# Patient Record
Sex: MALE | Race: WHITE | NOT HISPANIC OR LATINO | Employment: OTHER | URBAN - METROPOLITAN AREA
[De-identification: names, ages, dates, MRNs, and addresses within clinical notes are randomized per-mention and may not be internally consistent; named-entity substitution may affect disease eponyms.]

---

## 2017-01-20 ENCOUNTER — OFFICE VISIT (OUTPATIENT)
Dept: RHEUMATOLOGY | Facility: PHYSICIAN GROUP | Age: 72
End: 2017-01-20
Payer: COMMERCIAL

## 2017-01-20 VITALS
BODY MASS INDEX: 29.4 KG/M2 | HEIGHT: 68 IN | OXYGEN SATURATION: 94 % | SYSTOLIC BLOOD PRESSURE: 140 MMHG | DIASTOLIC BLOOD PRESSURE: 82 MMHG | TEMPERATURE: 98.4 F | HEART RATE: 72 BPM | RESPIRATION RATE: 12 BRPM | WEIGHT: 194 LBS

## 2017-01-20 DIAGNOSIS — M35.3 POLYMYALGIA RHEUMATICA (HCC): ICD-10-CM

## 2017-01-20 PROCEDURE — 1101F PT FALLS ASSESS-DOCD LE1/YR: CPT | Mod: 8P | Performed by: INTERNAL MEDICINE

## 2017-01-20 PROCEDURE — G8484 FLU IMMUNIZE NO ADMIN: HCPCS | Performed by: INTERNAL MEDICINE

## 2017-01-20 PROCEDURE — 99205 OFFICE O/P NEW HI 60 MIN: CPT | Performed by: INTERNAL MEDICINE

## 2017-01-20 PROCEDURE — 3017F COLORECTAL CA SCREEN DOC REV: CPT | Mod: 8P | Performed by: INTERNAL MEDICINE

## 2017-01-20 PROCEDURE — G8420 CALC BMI NORM PARAMETERS: HCPCS | Performed by: INTERNAL MEDICINE

## 2017-01-20 PROCEDURE — 1036F TOBACCO NON-USER: CPT | Performed by: INTERNAL MEDICINE

## 2017-01-20 PROCEDURE — G8432 DEP SCR NOT DOC, RNG: HCPCS | Performed by: INTERNAL MEDICINE

## 2017-01-20 PROCEDURE — 4040F PNEUMOC VAC/ADMIN/RCVD: CPT | Performed by: INTERNAL MEDICINE

## 2017-01-20 RX ORDER — ASPIRIN 325 MG
325 TABLET ORAL EVERY 6 HOURS PRN
COMMUNITY

## 2017-01-20 RX ORDER — FLECAINIDE ACETATE 100 MG/1
100 TABLET ORAL 2 TIMES DAILY
COMMUNITY

## 2017-01-20 RX ORDER — MONTELUKAST SODIUM 4 MG/1
1 TABLET, CHEWABLE ORAL 2 TIMES DAILY
COMMUNITY

## 2017-01-20 NOTE — MR AVS SNAPSHOT
"        Kris Chaudhary   2017 3:00 PM   Office Visit   MRN: [id number]    Department:  Rheumatology [facility]   Dept Phone:  [phone]    Description:  Male : 1945   Provider:  Karen Colvin M.D.           Reason for Visit     New Patient new patient      Allergies as of 2017     Allergen Noted Reactions    Cat Hair Extract 03/10/2015       Trees, grass    Statins [Hmg-Coa-R Inhibitors] 2015       Muscle aches      You were diagnosed with     Polymyalgia rheumatica (CMS-HCC)   [725.ICD-9-CM]         Vital Signs     Blood Pressure Pulse Temperature Respirations Height Weight    140/82 mmHg 72 36.9 °C (98.4 °F) 12 1.721 m (5' 7.75\") 87.998 kg (194 lb)    Body Mass Index Oxygen Saturation Smoking Status             29.71 kg/m2 94% Never Smoker          Basic Information     Date Of Birth Sex Race Ethnicity Preferred Language    1945 Male White Non- English      Problem List              ICD-10-CM Priority Class Noted - Resolved    Atrial fibrillation with RVR (CMS-HCC) I48.91 High  3/1/2015 - Present    Fibromyalgia M79.7   3/2/2015 - Present    HTN (hypertension) I10   3/2/2015 - Present    Allergy to statin medication Z88.8   3/2/2015 - Present    Elevated troponin R79.89   3/2/2015 - Present      Health Maintenance        Date Due Completion Dates    IMM DTaP/Tdap/Td Vaccine (1 - Tdap) 1965 ---    COLONOSCOPY 1996 ---    IMM ZOSTER VACCINE 2006 ---    IMM PNEUMOCOCCAL 65+ (ADULT) LOW/MEDIUM RISK SERIES (2 of 2 - PCV13) 10/10/2013 10/10/2012    IMM INFLUENZA (1) 2016 10/10/2014            Current Immunizations     Influenza TIV (IM) 10/10/2014    Pneumococcal Vaccine (PCV7) Historical Data 10/10/2012    Pneumococcal polysaccharide vaccine (PPSV-23) 10/10/2012      Below and/or attached are the medications your provider expects you to take. Review all of your home medications and newly ordered medications with your provider and/or pharmacist. Follow medication " instructions as directed by your provider and/or pharmacist. Please keep your medication list with you and share with your provider. Update the information when medications are discontinued, doses are changed, or new medications (including over-the-counter products) are added; and carry medication information at all times in the event of emergency situations     Allergies:  CAT HAIR EXTRACT - (reactions not documented)     STATINS - (reactions not documented)               Medications  Valid as of: January 20, 2017 -  4:12 PM    Generic Name Brand Name Tablet Size Instructions for use    Ascorbic Acid (Tab) ascorbic acid 500 MG Take 1,000 mg by mouth every day.        Aspirin (Tab)  MG Take 325 mg by mouth every 6 hours as needed.        Colestipol HCl (Tab) COLESTID 1 GM Take 1 g by mouth 2 times a day.        Flecainide Acetate (Tab) TAMBOCOR 100 MG Take 100 mg by mouth 2 times a day.        Glucosamine-Chondroitin   Take 1 Tab by mouth every day.        Loratadine (Tab) CLARITIN 10 MG Take 10 mg by mouth every day.        Multiple Vitamins-Minerals (Tab) THERAGRAN-M  Take 1 Tab by mouth every day.        Omega-3 Fatty Acids (Cap) fish oil 1000 MG Take 1,000 mg by mouth every morning before breakfast.        .                 Medicines prescribed today were sent to:     None      Medication refill instructions:       If your prescription bottle indicates you have medication refills left, it is not necessary to call your provider’s office. Please contact your pharmacy and they will refill your medication.    If your prescription bottle indicates you do not have any refills left, you may request refills at any time through one of the following ways: The online [employer] system (except Urgent Care), by calling your provider’s office, or by asking your pharmacy to contact your provider’s office with a refill request. Medication refills are processed only during regular business hours and may not be available  until the next business day. Your provider may request additional information or to have a follow-up visit with you prior to refilling your medication.   *Please Note: Medication refills are assigned a new Rx number when refilled electronically. Your pharmacy may indicate that no refills were authorized even though a new prescription for the same medication is available at the pharmacy. Please request the medicine by name with the pharmacy before contacting your provider for a refill.        Your To Do List     Future Labs/Procedures Complete By Expires    CRP QUANTITIVE (NON-CARDIAC)  2/1/2017 1/20/2018    VITAMIN D,25 HYDROXY  As directed 1/20/2018    WESTERGREN SED RATE  As directed 1/20/2018         [employer] Access Code: Activation code not generated  Current [employer] Status: Active

## 2017-01-20 NOTE — PROGRESS NOTES
Chief Complaint- Polymyalgia Rheumatica    Chief Complaint   Patient presents with   • New Patient     new patient     Kris Chaudhary is a 71 y.o. male here as a new Rheumatology Consult referred by Pcp Pt States None for consultation regarding polymyalgia Rheumatica    HPI:   Mr. Kris Chaudhary presents with a history of PMR that was diagnosed during a hospitalization in 2014 for a atrial fibrillation exacerbation.  His symptoms presented with diffuse body aches and he brought this to the attention of the medical team.  He was treated with prednisone and reports an immediate response.  He states is took him 1 year to taper of prednisone.    He has been well until October 2016 when he developed aches and pain in the legs.  He also felt he was unable to raise his shoulders.  He was concern this may have been a flare of his PMR.  He also was started on physical therapy and reports improvement in his shoulder mobility with his shoulders.    Mr. Kris Chaudhary present with intermittent episodes of pain at the CMC joints and bilateral wrist pain exacerbated with use.  He denies prolonged morning stiffness.    Today he denies any symptoms of morning stiffness, shoulder pain, or symptoms resembling PMR.  He also denies vision changes or vision loss, scalp tenderness, headache, jaw claudication.      Past medical History: PMR responsive to steroids in May 2014 that coincided with his initial incidence of atrial fibrillation s/p cardioversion.  November 23 of 2016 and Dec 7 2016, basal cell cancer, nephrolithiasis    Family History: Father had HTN, mother had lung cancer was also previous smoker, maternal grandmother had breast cancer    Social History: avid skiier, NEVER smoker    He denies morning stiffness, no Raynauds, no dry eyes no dry mouth, no jaw claudication, no scalp tenderness, no vision changes, no worsening tinnitus, no abdominal pain.    Current medicines (including changes today)  Current Outpatient  Prescriptions   Medication Sig Dispense Refill   • flecainide (TAMBOCOR) 100 MG Tab Take 100 mg by mouth 2 times a day.     • aspirin (ASA) 325 MG Tab Take 325 mg by mouth every 6 hours as needed.     • colestipol (COLESTID) 1 GM Tab Take 1 g by mouth 2 times a day.     • loratadine (CLARITIN) 10 MG TABS Take 10 mg by mouth every day.     • therapeutic multivitamin-minerals (THERAGRAN-M) TABS Take 1 Tab by mouth every day.     • Glucosamine-Chondroitin (MOVE FREE PO) Take 1 Tab by mouth every day.     • ascorbic acid (ASCORBIC ACID) 500 MG TABS Take 1,000 mg by mouth every day.     • Omega-3 Fatty Acids (FISH OIL) 1000 MG CAPS capsule Take 1,000 mg by mouth every morning before breakfast.       No current facility-administered medications for this visit.     He  has a past medical history of Renal disorder and Arrhythmia (2014).  He  has past surgical history that includes rotator cuff repair and other orthopedic surgery (1976).  No family history on file.  No family status information on file.     Social History   Substance Use Topics   • Smoking status: Never Smoker    • Smokeless tobacco: Never Used   • Alcohol Use: Yes      Comment: 2 drinks per week     Social History     Social History Narrative   • No narrative on file         ROS  Constitutional ROS: No unexplained fevers, sweats, or chills  Eye ROS: No eye pain, redness, discharge  Ear ROS: No recent change in hearing  Mouth/Throat ROS: No recent change in voice or hoarseness  Neck ROS: No significant pain in neck  Pulmonary ROS: No dyspnea on exertion, No wheezing  Cardiovascular ROS: No chest pain, No shortness of breath  Gastrointestinal ROS: No nausea, vomiting, diarrhea, or constipation  Musculoskeletal/Extremities ROS: No peripheral edema  Hematologic/Lymphatic ROS: No night sweats  Skin/Integumentary ROS: color, texture and temperature normal  Neurologic ROS: Normal development       Objective:     Blood pressure 140/82, pulse 72, temperature 36.9 °C  "(98.4 °F), resp. rate 12, height 1.721 m (5' 7.75\"), weight 87.998 kg (194 lb), SpO2 94 %. Body mass index is 29.71 kg/(m^2).  Physical Exam:    Filed Vitals:    01/20/17 1501   BP: 140/82   Pulse: 72   Temp: 36.9 °C (98.4 °F)   Resp: 12   Height: 1.721 m (5' 7.75\")   Weight: 87.998 kg (194 lb)   SpO2: 94%    Body mass index is 29.71 kg/(m^2).    General/Constitutional: NAD not diaphoretic, comfortable  Eyes: clear conjunctiva, no scleral icterus, EOMI,  Ears, Nose, Mouth,Throat: no oral ulcers, good dentition, moist mucous membranes, no discharge from ears bilaterally  Cardiovascular: regular rate and rhythm.  No murmurs, gallops, rubs, 2 + temporal pulses  Respiratory: normal effort, unlabored respiration.  On auscultation no wheezes, rales, rhonchi.  Clear to auscultation.  GI: soft, NTTP no hepatosplenomegaly, nondistended  Musculoskeletal  Axial:  Cervical: mild limited ROM with rotation at 45 degrees right and left  Thoracic: no midline tenderness and no paraspinal tenderenss  Lumbar: no midline tenderness  Upper Extremities:  No synovitis of the PIP, DIP, MCP  There is mild Heberdon nodes at the DIP on the 5th digit right hand  Wrists and Elbows have good ROM  Muscle Strength: 5/5 in deltoids, biceps, triceps, finger , wrists extension bilateral  Lower Extremities:  No knee effusion bilateral, No crepitus bilateral  Gait is normal  Skin: Limited skin exam.  no rashes, no digital ulcerations, no alopecia, no tophi, no skin thickening, no nodules  Neuro: CN II-XII grossly intact, Alert, Oriented x 3, moves all four extremities  Psych: normal affect, normal mood, judgement appropriate, follows commands, responses are appropritae  Heme/Lymph: no cervical adenopathy      No results found for: QNTTBGOLD  No results found for: HEPBCORTOT, HEPBCORIGM, HEPBSAG  No results found for: HEPCAB  Lab Results   Component Value Date/Time    SODIUM 139 03/02/2015 05:08 AM    POTASSIUM 3.6 03/02/2015 05:08 AM    CHLORIDE " 107 03/02/2015 05:08 AM    CO2 23 03/02/2015 05:08 AM    GLUCOSE 135* 03/02/2015 05:08 AM    BUN 17 03/02/2015 05:08 AM    CREATININE 1.17 03/02/2015 05:08 AM      Lab Results   Component Value Date/Time    WBC 6.4 03/02/2015 05:08 AM    RBC 4.39* 03/02/2015 05:08 AM    HEMOGLOBIN 13.5* 03/02/2015 05:08 AM    HEMATOCRIT 39.8* 03/02/2015 05:08 AM    MCV 90.7 03/02/2015 05:08 AM    MCH 30.8 03/02/2015 05:08 AM    MCHC 33.9 03/02/2015 05:08 AM    MPV 11.0 03/02/2015 05:08 AM    NEUTROPHILS-POLYS 68.5 03/01/2015 08:08 PM    LYMPHOCYTES 18.5* 03/01/2015 08:08 PM    MONOCYTES 10.6 03/01/2015 08:08 PM    EOSINOPHILS 1.2 03/01/2015 08:08 PM    BASOPHILS 0.6 03/01/2015 08:08 PM      Lab Results   Component Value Date/Time    CALCIUM 8.9 03/02/2015 05:08 AM    AST(SGOT) 41 03/02/2015 05:08 AM    ALT(SGPT) 16 03/02/2015 05:08 AM    ALKALINE PHOSPHATASE 73 03/02/2015 05:08 AM    TOTAL BILIRUBIN 0.7 03/02/2015 05:08 AM    ALBUMIN 3.6 03/02/2015 05:08 AM    TOTAL PROTEIN 6.3 03/02/2015 05:08 AM     No results found for: URICACID, RHEUMFACTN, CCPANTIBODY, ANTINUCAB  No results found for: SEDRATEWES, CREACTPROT  No results found for: RUSSELVIPER, DRVVTINTERP  No results found for: R0WRULLLXOM, [id number], IGGCARDIOLI, IGMCARDIOLI, IGACARDIOLI, CRYOGLOBULIN  No results found for: ANADIRECT, ANTIDNADS, RNPAB, SMITHAB, KWPTFNB21, SSAROAB, SSBLAAB, BWVPII9ES, CENTROMBAB  No results found for: ANTIDNADS, DSDNA, AGBMAB, GBMABA, ANCAIGG, [id number], JO1AB, RNPAB, ANTISSASJ, ANTISSBSJ  No results found for: COLORURINE, SPECGRAVITY, PHURINE, GLUCOSEUR, KETONES, PROTEINURIN  No results found for: TOTPROTUR, TOTALVOLUME, [id number]  No results found for: SSA60, SSA52  No results found for: HBA1C  No results found for: CPKTOTAL  No results found for: G6PD  No results found for: [id number]  No results found for: ACESERUM  No results found for: 25HYDROXY  No results found for: TSH, FREEDIR  Lab Results   Component Value Date/Time    TSH 2.330  03/01/2015 08:08 PM    FREE T-4 0.86 03/01/2015 08:08 PM     No results found for: MICROSOMALA, ANTITHYROGL  No results found for: IGGLYMEABS  No results found for: ANTIMITOCHO, FACTIN  No results found for: IGA, TTRANSIGA, ENDOIGA  No results found for: FLTYPE, CRYSTALSBDF  No results found for: ISTATICAL  No results found for: ISTATCREAT  No results found for: CTELOPEP  No results found for: GBMABG  No results found for: PTHINTACT      Outside labs show a CRP of 0.71 which is normal. Creatinine was 1.2 with a GFR of 60 his vitamin D level was 43.3 these are labs from [place] 10, 2016 his white blood cell count was 5.2 hemoglobin was 14.8 platelets were 206    He has a wrist x-ray that shows decreasing soft tissue swelling with continuing indeterminate chip fracture at the distal carpal bones appreciated on x-ray generally fourth 2016    Notes from June 24, 2016 showed that he wanted to go to physical therapy for shoulder pain                        Results for orders placed during the hospital encounter of 03/01/15   2-D ECHO W/DOPPLER (ECHOCARDIOGRAM COMP W/O CONT)               Assessment and Plan:  Mr. Kris Chaudhary presents with a history of PMR but today is asymptomatic.  We will recheck ESR and CRP to obtain his baseline reading.  In addition we discussed symptoms of GCA and some PMR patient to develop GCA.    We also discussed the side effects of prolonged use of prednisone.    At this time he is not on prednisone nor are there active symptoms.  We discussed should he flare we will check his ESR and CRP and consider another course of prednisone.  Also it is important to discuss should he have recurrent episodes and we are unable to taper we would need to consider DMARDS    [facility]  Advise patient to take calcium and vitamin D  Will check vitamin D level    1. Polymyalgia rheumatica (CMS-HCC)  flecainide (TAMBOCOR) 100 MG Tab    aspirin (ASA) 325 MG Tab    colestipol (COLESTID) 1 GM Tab    VITAMIN D,25  HYDROXY    WESTERGREN SED RATE    CRP QUANTITIVE (NON-CARDIAC)       Followup: Return in about 4 weeks (around 2/17/2017). or sooner prn  Patient was seen 60 minutes face-to-face (excluding time for procedures)  of which more than 50% the time was spent counseling the patient regarding  rheumatological conditions and care. Therapy was discussed in detail.  Thank you for this referral.

## 2017-10-27 ASSESSMENT — ENCOUNTER SYMPTOMS
CHILLS: 0
FEVER: 0

## 2017-10-28 NOTE — PROGRESS NOTES
Subjective:   Date of Consultation:10/30/2017  7:21 AM  Primary care physician:Pcp Pt States None      Reason for Consultation:  Mr. Chaudhary  is a pleasant 71 y.o. year old male who presented with follow-up PMR      PMR and arthralgia  At our initial visit on 1/20/2017 he was to check ESR and CRP.  We were to consider another course if prednisone.  He reports feeling pretty well in January but since has declined.Since our last visit he has had increasing myalgia in the thigh but also morning stiffness in the hands for > 1 hour.  He reports starting a stain two months but his worsening symptoms and myalgias started prior to start of the statins.  He denies xerostomia.  He denies dry eyes    [facility]    [facility] was 1.46 mg/dL (0.05-0.7)  Rheumatoid factor  Negative  (0-14)  Esr 16 (0/20)    February 27/2017  CRP was 1.6 mg /dL (0-5)  Esr = 16      Rash   started a few months ago.  He reports a few weeks of intermittent pruritis over the     RHEUM HISTORY    Mr. Kris Chaudhary presents with a history of PMR that was diagnosed during a hospitalization in 2014 for a atrial fibrillation exacerbation.  His symptoms presented with diffuse body aches and he brought this to the attention of the medical team.  He was treated with prednisone and reports an immediate response.  He states it took him 1 year to taper of prednisone.    He has been well until October 2016 when he developed aches and pain in the legs.  He also felt he was unable to raise his shoulders.  He was concern this may have been a flare of his PMR.  He also was started on physical therapy and reports improvement in his shoulder mobility with his shoulders.    At our initial visit he denies any symptoms of PMR or GCA.  However he presented with wrist pain bilateral exacerbated with use .               Past medical History: PMR responsive to steroids in May 2014 that coincided with his initial incidence of atrial fibrillation s/p cardioversion.  November 23  of 2016 and Dec 7 2016, basal cell cancer, nephrolithiasis.  Reports history of statin myalgias??     Family History: Father had HTN, mother had lung cancer was also previous smoker, maternal grandmother had breast cancer     Social History: hobby - skies,  NEVER smoker       Past Medical History:   Diagnosis Date   • Arrhythmia 2014    aflutter   • Renal disorder     stones, frequently     Past Surgical History:   Procedure Laterality Date   • OTHER ORTHOPEDIC SURGERY  1976    Back   • ROTATOR CUFF REPAIR      L shoulder     Allergies   Allergen Reactions   • Cat Hair Extract      Trees, grass   • Statins [Hmg-Coa-R Inhibitors]      Muscle aches     Outpatient Encounter Prescriptions as of 10/30/2017   Medication Sig Dispense Refill   • rosuvastatin (CRESTOR) 5 MG Tab Take 5 mg by mouth every evening.     • Coenzyme Q10 (EQL COQ10) 300 MG Cap Take  by mouth.     • aspirin (ASA) 81 MG Chew Tab chewable tablet Take 81 mg by mouth every day.     • flecainide (TAMBOCOR) 100 MG Tab Take 100 mg by mouth 2 times a day.     • loratadine (CLARITIN) 10 MG TABS Take 10 mg by mouth every day.     • therapeutic multivitamin-minerals (THERAGRAN-M) TABS Take 1 Tab by mouth every day.     • aspirin (ASA) 325 MG Tab Take 325 mg by mouth every 6 hours as needed.     • colestipol (COLESTID) 1 GM Tab Take 1 g by mouth 2 times a day.     • Glucosamine-Chondroitin (MOVE FREE PO) Take 1 Tab by mouth every day.     • ascorbic acid (ASCORBIC ACID) 500 MG TABS Take 1,000 mg by mouth every day.     • Omega-3 Fatty Acids (FISH OIL) 1000 MG CAPS capsule Take 1,000 mg by mouth every morning before breakfast.       No facility-administered encounter medications on file as of 10/30/2017.        Social History     Social History   • Marital status:      Spouse name: N/A   • Number of children: N/A   • Years of education: N/A     Occupational History   • Not on file.     Social History Main Topics   • Smoking status: Never Smoker   •  Smokeless tobacco: Never Used   • Alcohol use Yes      Comment: 2 drinks per week   • Drug use: No   • Sexual activity: Not on file     Other Topics Concern   • Not on file     Social History Narrative   • No narrative on file      History   Smoking Status   • Never Smoker   Smokeless Tobacco   • Never Used     History   Alcohol Use   • Yes     Comment: 2 drinks per week     History   Drug Use No       No family history on file.    Review of Systems   Constitutional: Negative for chills and fever.   Musculoskeletal: Positive for joint pain and myalgias.        Objective:   /86   Pulse 60   Temp 36.2 °C (97.2 °F)   Resp 12   Wt 88.9 kg (196 lb)   SpO2 100%   BMI 30.02 kg/m²     Physical Exam   Constitutional: He is oriented to person, place, and time. He appears well-developed and well-nourished. No distress.   HENT:   Head: Normocephalic and atraumatic.   Right Ear: External ear normal.   Left Ear: External ear normal.   Eyes: Conjunctivae are normal. Right eye exhibits no discharge. Left eye exhibits no discharge. No scleral icterus.   Pulmonary/Chest: No stridor.   Musculoskeletal:   No periarticular swelling bilateral of the MCP, PIP, DIP  No wrist swelling bilateral  Bony enlargement over the MCP   Neurological: He is alert and oriented to person, place, and time. Abnormal muscle tone: PMR.   Skin: Rash noted. He is not diaphoretic.   Excoriations over the upper back and bilateral at the level of T9   Psychiatric: He has a normal mood and affect. His behavior is normal. Judgment and thought content normal.       Assessment:     1. PMR (polymyalgia rheumatica) (CMS-[facility])  CCP    MISA ANTIBODY WITH REFLEX    HEP B CORE AB TOTAL    HEP B SURFACE ANTIGEN    HEP C VIRUS ANTIBODY    HEP B SURFACE AB    CBC WITH DIFFERENTIAL    COMP METABOLIC PANEL    CRP QUANTITIVE (NON-CARDIAC)    WESTERGREN SED RATE    G6PD QUANT + RBC    CREATINE KINASE    DX-HAND 3+ LEFT    DX-HAND 3+ RIGHT   2. Myalgia  CREATINE KINASE    3. Atrial fibrillation with RVR (CMS-HCC)     4. Arthralgia, unspecified joint  CCP    MISA ANTIBODY WITH REFLEX    HEP B CORE AB TOTAL    HEP B SURFACE ANTIGEN    HEP C VIRUS ANTIBODY    HEP B SURFACE AB    CBC WITH DIFFERENTIAL    COMP METABOLIC PANEL    CRP QUANTITIVE (NON-CARDIAC)    WESTERGREN SED RATE    G6PD QUANT + RBC    CREATINE KINASE    DX-HAND 3+ LEFT    DX-HAND 3+ RIGHT     Labs:    No results found for: QNTTBGOLD  No results found for: HEPBCORTOT, HEPBCORIGM, HEPBSAG  No results found for: HEPCAB  Lab Results   Component Value Date/Time    SODIUM 139 03/02/2015 05:08 AM    POTASSIUM 3.6 03/02/2015 05:08 AM    CHLORIDE 107 03/02/2015 05:08 AM    CO2 23 03/02/2015 05:08 AM    GLUCOSE 135 (H) 03/02/2015 05:08 AM    BUN 17 03/02/2015 05:08 AM    CREATININE 1.17 03/02/2015 05:08 AM      Lab Results   Component Value Date/Time    WBC 6.4 03/02/2015 05:08 AM    RBC 4.39 (L) 03/02/2015 05:08 AM    HEMOGLOBIN 13.5 (L) 03/02/2015 05:08 AM    HEMATOCRIT 39.8 (L) 03/02/2015 05:08 AM    MCV 90.7 03/02/2015 05:08 AM    MCH 30.8 03/02/2015 05:08 AM    MCHC 33.9 03/02/2015 05:08 AM    MPV 11.0 03/02/2015 05:08 AM    NEUTSPOLYS 68.5 03/01/2015 08:08 PM    LYMPHOCYTES 18.5 (L) 03/01/2015 08:08 PM    MONOCYTES 10.6 03/01/2015 08:08 PM    EOSINOPHILS 1.2 03/01/2015 08:08 PM    BASOPHILS 0.6 03/01/2015 08:08 PM      Lab Results   Component Value Date/Time    CALCIUM 8.9 03/02/2015 05:08 AM    ASTSGOT 41 03/02/2015 05:08 AM    ALTSGPT 16 03/02/2015 05:08 AM    ALKPHOSPHAT 73 03/02/2015 05:08 AM    TBILIRUBIN 0.7 03/02/2015 05:08 AM    ALBUMIN 3.6 03/02/2015 05:08 AM    TOTPROTEIN 6.3 03/02/2015 05:08 AM     No results found for: URICACID, RHEUMFACTN, CCPANTIBODY, ANTINUCAB  No results found for: SEDRATEWES, CREACTPROT  No results found for: RUSSELVIPER, DRVVTINTERP  No results found for: W4AJEJTPOFU, J9XPIKARTCR, IGGCARDIOLI, IGMCARDIOLI, IGACARDIOLI, CRYOGLOBULIN  No results found for: ANADIRECT, ANTIDNADS, RNPAB,  SMITHAB, YJTVPIN84, SSAROAB, SSBLAAB, ZEUUWY8ZR, CENTROMBAB  No results found for: ANTIDNADS, DSDNA, AGBMAB, GBMABA, ANCAIGG, [id number], JO1AB, RNPAB, ANTISSASJ, ANTISSBSJ  No results found for: COLORURINE, SPECGRAVITY, PHURINE, GLUCOSEUR, KETONES, PROTEINURIN  No results found for: TOTPROTUR, TOTALVOLUME, XFOGRYIS63  No results found for: SSA60, SSA52  No results found for: HBA1C  No results found for: CPKTOTAL  No results found for: G6PD  No results found for: APVW75AFVN  No results found for: ACESERUM  No results found for: 25HYDROXY  No results found for: TSH, FREEDIR  Lab Results   Component Value Date/Time    TSHULTRASEN 2.330 03/01/2015 08:08 PM    FREET4 0.86 03/01/2015 08:08 PM     No results found for: MICROSOMALA, ANTITHYROGL  No results found for: IGGLYMEABS  No results found for: ANTIMITOCHO, FACTIN  No results found for: IGA, TTRANSIGA, ENDOIGA  No results found for: FLTYPE, CRYSTALSBDF  No results found for: ISTATICAL  No results found for: ISTATCREAT  No results found for: CTELOPEP  No results found for: GBMABG  No results found for: PTHINTACT      Medical Decision Making:  Today's Assessment / Status / Plan:     PMR  CRP is elevated   Thus far no hip or shoulder stiffness that is deviating from baseline  Will repeat ESR and CRP  If elevated willl discuss possibly restarting prednisone and a DMARD  Today we discussed plaquenil briefly.      Hand arthralgias  With prolonged morning stiffness.  No synovitis on exam.  [facility] - [facility]  Will obtain hand xrays  Check MISA and CCP      Rash  Advise patient to follow-up with PCP is progressive  Could be contact dermatitis though denies changing his soap or detergent.   He does recall onset of rash after starting CoQ10.  SUggest he see if stopping this will improve the rash  He does have a history of shingles but it is bilateral and no evidence or history  of fluid filled blisters.  He denies this calvin similar to his  previous history of shingles.      1. PMR (polymyalgia rheumatica) (CMS-[facility])  CCP    MISA ANTIBODY WITH REFLEX    HEP B CORE AB TOTAL    HEP B SURFACE ANTIGEN    HEP C VIRUS ANTIBODY    HEP B SURFACE AB    CBC WITH DIFFERENTIAL    COMP METABOLIC PANEL    CRP QUANTITIVE (NON-CARDIAC)    WESTERGREN SED RATE    G6PD QUANT + RBC    CREATINE KINASE    DX-HAND 3+ LEFT    DX-HAND 3+ RIGHT   2. Myalgia  CREATINE KINASE   3. Atrial fibrillation with RVR (CMS-[facility])     4. Arthralgia, unspecified joint  CCP    MISA ANTIBODY WITH REFLEX    HEP B CORE AB TOTAL    HEP B SURFACE ANTIGEN    HEP C VIRUS ANTIBODY    HEP B SURFACE AB    CBC WITH DIFFERENTIAL    COMP METABOLIC PANEL    CRP QUANTITIVE (NON-CARDIAC)    WESTERGREN SED RATE    G6PD QUANT + RBC    CREATINE KINASE    DX-HAND 3+ LEFT    DX-HAND 3+ RIGHT     Return in about 3 weeks (around 11/20/2017).      I have spent greater than 50% of this 30 minute visit in counseling/coordination of care with the patient regarding his therapy plan.    He agreed and verbalized his agreement and understanding with the current plan. I answered all questions and concerns he has at this time and advised him to call at any time in there interim with questions or concerns in regards to his care.    Thank you for allowing me to participate in his care, I will continue to follow closely.

## 2017-10-30 ENCOUNTER — OFFICE VISIT (OUTPATIENT)
Dept: RHEUMATOLOGY | Facility: PHYSICIAN GROUP | Age: 72
End: 2017-10-30
Payer: COMMERCIAL

## 2017-10-30 VITALS
DIASTOLIC BLOOD PRESSURE: 86 MMHG | RESPIRATION RATE: 12 BRPM | SYSTOLIC BLOOD PRESSURE: 144 MMHG | WEIGHT: 196 LBS | HEART RATE: 60 BPM | OXYGEN SATURATION: 100 % | TEMPERATURE: 97.2 F | BODY MASS INDEX: 30.02 KG/M2

## 2017-10-30 DIAGNOSIS — I48.91 ATRIAL FIBRILLATION WITH RVR (HCC): ICD-10-CM

## 2017-10-30 DIAGNOSIS — M35.3 PMR (POLYMYALGIA RHEUMATICA) (HCC): ICD-10-CM

## 2017-10-30 DIAGNOSIS — M79.10 MYALGIA: ICD-10-CM

## 2017-10-30 DIAGNOSIS — M25.50 ARTHRALGIA, UNSPECIFIED JOINT: ICD-10-CM

## 2017-10-30 PROCEDURE — 99214 OFFICE O/P EST MOD 30 MIN: CPT | Performed by: INTERNAL MEDICINE

## 2017-10-30 RX ORDER — ROSUVASTATIN CALCIUM 5 MG/1
5 TABLET, COATED ORAL EVERY EVENING
COMMUNITY

## 2017-10-30 RX ORDER — ASPIRIN 81 MG/1
81 TABLET, CHEWABLE ORAL DAILY
COMMUNITY

## 2017-10-30 ASSESSMENT — ENCOUNTER SYMPTOMS: MYALGIAS: 1

## 2017-10-30 NOTE — LETTER
[facility]   [address], [address]  [place] [address]  Phone: [phone]  Fax: [phone]              Encounter Date: 10/30/2017    Dear Dr. Pittman ref. provider found,    It was a pleasure seeing your patient, Kris Chaudhary, on 10/30/2017. Diagnoses of PMR (polymyalgia rheumatica) (CMS-[facility]), Myalgia, Atrial fibrillation with RVR (CMS-[facility]), and Arthralgia, unspecified joint were pertinent to this visit.     Please find attached progress note which includes the history I obtained from Mr. Chaudhary, my physical examination findings, my impression and recommendations.      Once again, it was a pleasure participating in your patient's care.  Please feel free to contact me if you have any questions or if I can be of any further assistance to your patients.      Sincerely,    Karen Colvin M.D.  Electronically Signed          PROGRESS NOTE:  Subjective:   Date of Consultation:10/30/2017  7:21 AM  Primary care physician:Pcp Pt States None      Reason for Consultation:  Mr. Chaudhary  is a pleasant 71 y.o. year old male who presented with follow-up PMR      PMR  At our initial visit on 1/20/2017 he was to check ESR and CRP.  We were to consider another course if prednisone.  He reports feeling pretty well in January but since has declined.Since our last visit he has had increasing myalgia in the thigh but also morning stiffness in the hands for > 1 hour.  He reports starting a stain two months but his worsening symptoms and myalgias started prior to start of the statins.  He denies xerostomia.  He denies dry eyes    [facility]    CRP HS was 1.46 mg/dL (0.05-0.7)  Rheumatoid factor  Negative  (0-14)  Esr 16 (0/20)    February 27/2017  CRP was 1.6 mg /dL (0-5)  Esr = 16      Rash   started a few months ago.  He reports a few weeks of intermittent pruritis over the     RHEUM HISTORY    Mr. Kris Chaudhary presents with a history of PMR that was diagnosed during a hospitalization in 2014 for a  atrial fibrillation exacerbation.  His symptoms presented with diffuse body aches and he brought this to the attention of the medical team.  He was treated with prednisone and reports an immediate response.  He states it took him 1 year to taper of prednisone.    He has been well until October 2016 when he developed aches and pain in the legs.  He also felt he was unable to raise his shoulders.  He was concern this may have been a flare of his PMR.  He also was started on physical therapy and reports improvement in his shoulder mobility with his shoulders.    At our initial visit he denies any symptoms of PMR or GCA.  However he presented with wrist pain bilateral exacerbated with use .               Past medical History: PMR responsive to steroids in May 2014 that coincided with his initial incidence of atrial fibrillation s/p cardioversion.  November 23 of 2016 and Dec 7 2016, basal cell cancer, nephrolithiasis.  Reports history of statin myalgias??     Family History: Father had HTN, mother had lung cancer was also previous smoker, maternal grandmother had breast cancer     Social History: hobby - skies,  NEVER smoker       Past Medical History:   Diagnosis Date   • Arrhythmia 2014    aflutter   • Renal disorder     stones, frequently     Past Surgical History:   Procedure Laterality Date   • OTHER ORTHOPEDIC SURGERY  1976    Back   • ROTATOR CUFF REPAIR      L shoulder     Allergies   Allergen Reactions   • Cat Hair Extract      Trees, grass   • Statins [Hmg-Coa-R Inhibitors]      Muscle aches     Outpatient Encounter Prescriptions as of 10/30/2017   Medication Sig Dispense Refill   • rosuvastatin (CRESTOR) 5 MG Tab Take 5 mg by mouth every evening.     • Coenzyme Q10 (EQL COQ10) 300 MG Cap Take  by mouth.     • aspirin (ASA) 81 MG Chew Tab chewable tablet Take 81 mg by mouth every day.     • flecainide (TAMBOCOR) 100 MG Tab Take 100 mg by mouth 2 times a day.     • loratadine (CLARITIN) 10 MG TABS Take 10 mg by  mouth every day.     • therapeutic multivitamin-minerals (THERAGRAN-M) TABS Take 1 Tab by mouth every day.     • aspirin (ASA) 325 MG Tab Take 325 mg by mouth every 6 hours as needed.     • colestipol (COLESTID) 1 GM Tab Take 1 g by mouth 2 times a day.     • Glucosamine-Chondroitin (MOVE FREE PO) Take 1 Tab by mouth every day.     • ascorbic acid (ASCORBIC ACID) 500 MG TABS Take 1,000 mg by mouth every day.     • Omega-3 Fatty Acids (FISH OIL) 1000 MG CAPS capsule Take 1,000 mg by mouth every morning before breakfast.       No facility-administered encounter medications on file as of 10/30/2017.        Social History     Social History   • Marital status:      Spouse name: N/A   • Number of children: N/A   • Years of education: N/A     Occupational History   • Not on file.     Social History Main Topics   • Smoking status: Never Smoker   • Smokeless tobacco: Never Used   • Alcohol use Yes      Comment: 2 drinks per week   • Drug use: No   • Sexual activity: Not on file     Other Topics Concern   • Not on file     Social History Narrative   • No narrative on file      History   Smoking Status   • Never Smoker   Smokeless Tobacco   • Never Used     History   Alcohol Use   • Yes     Comment: 2 drinks per week     History   Drug Use No       No family history on file.    Review of Systems   Constitutional: Negative for chills and fever.   Musculoskeletal: Positive for joint pain and myalgias.        Objective:   /86   Pulse 60   Temp 36.2 °C (97.2 °F)   Resp 12   Wt 88.9 kg (196 lb)   SpO2 100%   BMI 30.02 kg/m²      Physical Exam   Constitutional: He is oriented to person, place, and time. He appears well-developed and well-nourished. No distress.   HENT:   Head: Normocephalic and atraumatic.   Right Ear: External ear normal.   Left Ear: External ear normal.   Eyes: Conjunctivae are normal. Right eye exhibits no discharge. Left eye exhibits no discharge. No scleral icterus.   Pulmonary/Chest: No  stridor.   Musculoskeletal:   No periarticular swelling bilateral of the MCP, PIP, DIP  No wrist swelling bilateral  Bony enlargement over the MCP   Neurological: He is alert and oriented to person, place, and time. Abnormal muscle tone: PMR.   Skin: Rash noted. He is not diaphoretic.   Excoriations over the upper back and bilateral at the level of T9   Psychiatric: He has a normal mood and affect. His behavior is normal. Judgment and thought content normal.       Assessment:     1. PMR (polymyalgia rheumatica) (CMS-[facility])  CCP    MISA ANTIBODY WITH REFLEX    HEP B CORE AB TOTAL    HEP B SURFACE ANTIGEN    HEP C VIRUS ANTIBODY    HEP B SURFACE AB    CBC WITH DIFFERENTIAL    COMP METABOLIC PANEL    CRP QUANTITIVE (NON-CARDIAC)    WESTERGREN SED RATE    G6PD QUANT + RBC    CREATINE KINASE    DX-HAND 3+ LEFT    DX-HAND 3+ RIGHT   2. Myalgia  CREATINE KINASE   3. Atrial fibrillation with RVR (CMS-HCC)     4. Arthralgia, unspecified joint  CCP    MISA ANTIBODY WITH REFLEX    HEP B CORE AB TOTAL    HEP B SURFACE ANTIGEN    HEP C VIRUS ANTIBODY    HEP B SURFACE AB    CBC WITH DIFFERENTIAL    COMP METABOLIC PANEL    CRP QUANTITIVE (NON-CARDIAC)    WESTERGREN SED RATE    G6PD QUANT + RBC    CREATINE KINASE    DX-HAND 3+ LEFT    DX-HAND 3+ RIGHT     Labs:    No results found for: QNTTBGOLD  No results found for: HEPBCORTOT, HEPBCORIGM, HEPBSAG  No results found for: HEPCAB  Lab Results   Component Value Date/Time    SODIUM 139 03/02/2015 05:08 AM    POTASSIUM 3.6 03/02/2015 05:08 AM    CHLORIDE 107 03/02/2015 05:08 AM    CO2 23 03/02/2015 05:08 AM    GLUCOSE 135 (H) 03/02/2015 05:08 AM    BUN 17 03/02/2015 05:08 AM    CREATININE 1.17 03/02/2015 05:08 AM      Lab Results   Component Value Date/Time    WBC 6.4 03/02/2015 05:08 AM    RBC 4.39 (L) 03/02/2015 05:08 AM    HEMOGLOBIN 13.5 (L) 03/02/2015 05:08 AM    HEMATOCRIT 39.8 (L) 03/02/2015 05:08 AM    MCV 90.7 03/02/2015 05:08 AM    MCH 30.8 03/02/2015 05:08 AM    MCHC 33.9  03/02/2015 05:08 AM    MPV 11.0 03/02/2015 05:08 AM    NEUTSPOLYS 68.5 03/01/2015 08:08 PM    LYMPHOCYTES 18.5 (L) 03/01/2015 08:08 PM    MONOCYTES 10.6 03/01/2015 08:08 PM    EOSINOPHILS 1.2 03/01/2015 08:08 PM    BASOPHILS 0.6 03/01/2015 08:08 PM      Lab Results   Component Value Date/Time    CALCIUM 8.9 03/02/2015 05:08 AM    ASTSGOT 41 03/02/2015 05:08 AM    ALTSGPT 16 03/02/2015 05:08 AM    ALKPHOSPHAT 73 03/02/2015 05:08 AM    TBILIRUBIN 0.7 03/02/2015 05:08 AM    ALBUMIN 3.6 03/02/2015 05:08 AM    TOTPROTEIN 6.3 03/02/2015 05:08 AM     No results found for: URICACID, RHEUMFACTN, CCPANTIBODY, ANTINUCAB  No results found for: SEDRATEWES, CREACTPROT  No results found for: RUSSELVIPER, DRVVTINTERP  No results found for: E5IIBGWPMRN, [id number], IGGCARDIOLI, IGMCARDIOLI, IGACARDIOLI, CRYOGLOBULIN  No results found for: ANADIRECT, ANTIDNADS, RNPAB, SMITHAB, IJUPFWO19, SSAROAB, SSBLAAB, [id number], CENTROMBAB  No results found for: ANTIDNADS, DSDNA, AGBMAB, GBMABA, ANCAIGG, [id number], JO1AB, RNPAB, ANTISSASJ, ANTISSBSJ  No results found for: COLORURINE, SPECGRAVITY, PHURINE, GLUCOSEUR, KETONES, PROTEINURIN  No results found for: TOTPROTUR, TOTALVOLUME, [id number]  No results found for: SSA60, SSA52  No results found for: HBA1C  No results found for: CPKTOTAL  No results found for: G6PD  No results found for: [id number]  No results found for: ACESERUM  No results found for: 25HYDROXY  No results found for: TSH, FREEDIR  Lab Results   Component Value Date/Time    TSHULTRASEN 2.330 03/01/2015 08:08 PM    FREET4 0.86 03/01/2015 08:08 PM     No results found for: MICROSOMALA, ANTITHYROGL  No results found for: IGGLYMEABS  No results found for: ANTIMITOCHO, FACTIN  No results found for: IGA, TTRANSIGA, ENDOIGA  No results found for: FLTYPE, CRYSTALSBDF  No results found for: ISTATICAL  No results found for: ISTATCREAT  No results found for: CTELOPEP  No results found for: GBMABG  No results found for:  PTHINTACT      Medical Decision Making:  Today's Assessment / Status / Plan:     PMR  CRP is elevated   Thus far no hip or shoulder stiffness that is deviating from baseline  Will repeat ESR and CRP  If elevated willl discuss possibly restarting prednisone and a DMARD  Today we discussed plaquenil briefly.      Hand arthralgias  With prolonged morning stiffness.  No synovitis on exam.  [facility] - [facility]  Will obtain hand xrays  Check MISA and CCP      Rash  Advise patient to follow-up with PCP is progressive  Could be contact dermatitis though denies changing his soap or detergent.   He does recall onset of rash after starting CoQ10.  SUggest he see if stopping this will improve the rash  He does have a history of shingles but it is bilateral and no evidence or history  of fluid filled blisters.  He denies this calvin similar to his previous history of shingles.      1. PMR (polymyalgia rheumatica) (CMS-[facility])  CCP    MISA ANTIBODY WITH REFLEX    HEP B CORE AB TOTAL    HEP B SURFACE ANTIGEN    HEP C VIRUS ANTIBODY    HEP B SURFACE AB    CBC WITH DIFFERENTIAL    COMP METABOLIC PANEL    CRP QUANTITIVE (NON-CARDIAC)    WESTERGREN SED RATE    G6PD QUANT + RBC    CREATINE KINASE    DX-HAND 3+ LEFT    DX-HAND 3+ RIGHT   2. Myalgia  CREATINE KINASE   3. Atrial fibrillation with RVR (CMS-[facility])     4. Arthralgia, unspecified joint  CCP    MISA ANTIBODY WITH REFLEX    HEP B CORE AB TOTAL    HEP B SURFACE ANTIGEN    HEP C VIRUS ANTIBODY    HEP B SURFACE AB    CBC WITH DIFFERENTIAL    COMP METABOLIC PANEL    CRP QUANTITIVE (NON-CARDIAC)    WESTERGREN SED RATE    G6PD QUANT + RBC    CREATINE KINASE    DX-HAND 3+ LEFT    DX-HAND 3+ RIGHT     Return in about 3 weeks (around 11/20/2017).      I have spent greater than 50% of this 30 minute visit in counseling/coordination of care with the patient regarding his therapy plan.    He agreed and verbalized his agreement and understanding with the current plan.  I answered all questions and concerns he has at this time and advised him to call at any time in there interim with questions or concerns in regards to his care.    Thank you for allowing me to participate in his care, I will continue to follow closely.

## 2018-01-08 ENCOUNTER — OFFICE VISIT (OUTPATIENT)
Dept: RHEUMATOLOGY | Facility: PHYSICIAN GROUP | Age: 73
End: 2018-01-08
Payer: COMMERCIAL

## 2018-01-08 VITALS
BODY MASS INDEX: 30.63 KG/M2 | OXYGEN SATURATION: 95 % | DIASTOLIC BLOOD PRESSURE: 70 MMHG | RESPIRATION RATE: 12 BRPM | HEART RATE: 60 BPM | WEIGHT: 200 LBS | SYSTOLIC BLOOD PRESSURE: 146 MMHG | TEMPERATURE: 98 F

## 2018-01-08 DIAGNOSIS — I48.91 ATRIAL FIBRILLATION WITH RVR (HCC): ICD-10-CM

## 2018-01-08 DIAGNOSIS — I48.91 ATRIAL FIBRILLATION, UNSPECIFIED TYPE (HCC): ICD-10-CM

## 2018-01-08 DIAGNOSIS — M35.3 PMR (POLYMYALGIA RHEUMATICA) (HCC): ICD-10-CM

## 2018-01-08 PROCEDURE — 99214 OFFICE O/P EST MOD 30 MIN: CPT | Performed by: INTERNAL MEDICINE

## 2018-01-08 ASSESSMENT — ENCOUNTER SYMPTOMS
MYALGIAS: 1
FEVER: 0
CHILLS: 0

## 2018-01-08 NOTE — LETTER
[facility]   [address], [address]  [place] [address]  Phone: [phone]  Fax: [phone]              Encounter Date: 1/8/2018    Dear Dr. Pittman ref. provider found,    It was a pleasure seeing your patient, Kris Chaudhary, on 1/8/2018. Diagnoses of PMR (polymyalgia rheumatica) (CMS-HCC), Atrial fibrillation, unspecified type (CMS-HCC), and Atrial fibrillation with RVR (CMS-HCC) were pertinent to this visit.     Please find attached progress note which includes the history I obtained from Mr. Chaudhary, my physical examination findings, my impression and recommendations.      Once again, it was a pleasure participating in your patient's care.  Please feel free to contact me if you have any questions or if I can be of any further assistance to your patients.      Sincerely,    Karen Colvin M.D.  Electronically Signed          PROGRESS NOTE:  Subjective:   Date of Consultation:1/8/2018  8:35 AM  Primary care physician:Pcp Pt States None      Reason for Consultation:  Mr. Chaudhary  is a pleasant 71 y.o. year old male who presented with follow-up PMR      PMR and arthralgia  He reports stiffness in the hands and he takes an iburpofen in the morning.  He denies arthralgias other than the morning, however that morning stiffness/puffiness can last a few hours.    His imaging xrays is negative.  He also notes that his wrists have been more tender.  At last visit we did discuss plaquenil however our concern was the risk of arrhythmia due to concomitant use of flecanide.  He reports starting a statin two months but his worsening symptoms and myalgias started prior to start of the statins.  He denies xerostomia.  He denies dry eyes    [facility]    [facility] was 1.46 mg/dL (0.05-0.7)  Rheumatoid factor  Negative  (0-14)  Esr 16 (0/20)      February 27/2017  CRP was 1.6 mg /dL (0-5)  Esr = 16    9/28/2017  MISA IFA negative    11/9/2017  CRP was 1.2 (0-5)  G6PD 264 (146-376)  11/9/2017 MISA screen  negative    January 4th 2018  TSH 7.28 FT4 was 0.83    CBC: 6.29/14.2/198  AST = 25 ALT = 19 GGT = 21 (5-44)  Creatinine = 1.2  CPK = 210 (0-243)      Xray of the hands and wrists 11/20/2017  No joint effusion or erosions      Rash   At last visit he had started to develop a rash which has resolved.       RHEUM HISTORY  Mr. Kris Chaudhary presents with a history of PMR that was diagnosed during a hospitalization in 2014 for a atrial fibrillation exacerbation.  His symptoms presented with diffuse body aches and he brought this to the attention of the medical team.  He was treated with prednisone and reports an immediate response.  He states it took him 1 year to taper of prednisone.    He has been well until October 2016 when he developed aches and pain in the legs.  He also felt he was unable to raise his shoulders.  He was concern this may have been a flare of his PMR.  He also was started on physical therapy and reports improvement in his shoulder mobility with his shoulders.    At our initial visit he denies any symptoms of PMR or GCA.  However he presented with wrist pain bilateral exacerbated with use .      Past medical History: PMR responsive to steroids in May 2014 that coincided with his initial incidence of atrial fibrillation s/p cardioversion.  November 23 of 2016 and Dec 7 2016, basal cell cancer, nephrolithiasis.  Reports history of statin myalgias??     Family History: Father had HTN, mother had lung cancer was also previous smoker, maternal grandmother had breast cancer     Social History: hobby - skies,  NEVER smoker       Past Medical History:   Diagnosis Date   • Arrhythmia 2014    aflutter   • Renal disorder     stones, frequently     Past Surgical History:   Procedure Laterality Date   • OTHER ORTHOPEDIC SURGERY  1976    Back   • ROTATOR CUFF REPAIR      L shoulder     Allergies   Allergen Reactions   • Cat Hair Extract      Trees, grass   • Statins [Hmg-Coa-R Inhibitors]      Muscle aches          Outpatient Encounter Prescriptions as of 1/8/2018   Medication Sig Dispense Refill   • rosuvastatin (CRESTOR) 5 MG Tab Take 5 mg by mouth every evening.     • aspirin (ASA) 81 MG Chew Tab chewable tablet Take 81 mg by mouth every day.     • flecainide (TAMBOCOR) 100 MG Tab Take 100 mg by mouth 2 times a day.     • loratadine (CLARITIN) 10 MG TABS Take 10 mg by mouth every day.     • therapeutic multivitamin-minerals (THERAGRAN-M) TABS Take 1 Tab by mouth every day.     • Coenzyme Q10 (EQL COQ10) 300 MG Cap Take  by mouth.     • aspirin (ASA) 325 MG Tab Take 325 mg by mouth every 6 hours as needed.     • colestipol (COLESTID) 1 GM Tab Take 1 g by mouth 2 times a day.     • Glucosamine-Chondroitin (MOVE FREE PO) Take 1 Tab by mouth every day.     • ascorbic acid (ASCORBIC ACID) 500 MG TABS Take 1,000 mg by mouth every day.     • Omega-3 Fatty Acids (FISH OIL) 1000 MG CAPS capsule Take 1,000 mg by mouth every morning before breakfast.       No facility-administered encounter medications on file as of 1/8/2018.        Social History     Social History   • Marital status:      Spouse name: N/A   • Number of children: N/A   • Years of education: N/A     Occupational History   • Not on file.     Social History Main Topics   • Smoking status: Never Smoker   • Smokeless tobacco: Never Used   • Alcohol use Yes      Comment: 2 drinks per week   • Drug use: No   • Sexual activity: Not on file     Other Topics Concern   • Not on file     Social History Narrative   • No narrative on file      History   Smoking Status   • Never Smoker   Smokeless Tobacco   • Never Used     History   Alcohol Use   • Yes     Comment: 2 drinks per week     History   Drug Use No       No family history on file.    Review of Systems   Constitutional: Negative for chills and fever.   Musculoskeletal: Positive for joint pain and myalgias.        Objective:   /70   Pulse 60   Temp 36.7 °C (98 °F)   Resp 12   Wt 90.7 kg (200 lb)    SpO2 95%   BMI 30.63 kg/m²      Physical Exam   Constitutional: He is oriented to person, place, and time. He appears well-developed and well-nourished. No distress.   HENT:   Head: Normocephalic and atraumatic.   Right Ear: External ear normal.   Left Ear: External ear normal.   Eyes: Conjunctivae are normal. Right eye exhibits no discharge. Left eye exhibits no discharge. No scleral icterus.   Pulmonary/Chest: No stridor. No respiratory distress.   Musculoskeletal:   No periarticular swelling bilateral of the MCP, PIP, DIP  No wrist swelling bilateral  Bony enlargement over the MCP   Neurological: He is alert and oriented to person, place, and time. Abnormal muscle tone: PMR.   Skin: Skin is warm. He is not diaphoretic.   Limited skin exam   Psychiatric: He has a normal mood and affect. His behavior is normal. Judgment and thought content normal.       Assessment:     1. PMR (polymyalgia rheumatica) (CMS-HCC)  CBC WITH DIFFERENTIAL    COMP METABOLIC PANEL    CRP QUANTITIVE (NON-CARDIAC)    WESTERGREN SED RATE   2. Atrial fibrillation, unspecified type (CMS-HCC)     3. Atrial fibrillation with RVR (CMS-HCC)       Labs:    No results found for: QNTTBGOLD  No results found for: HEPBCORTOT, HEPBCORIGM, HEPBSAG  No results found for: HEPCAB  Lab Results   Component Value Date/Time    SODIUM 139 03/02/2015 05:08 AM    POTASSIUM 3.6 03/02/2015 05:08 AM    CHLORIDE 107 03/02/2015 05:08 AM    CO2 23 03/02/2015 05:08 AM    GLUCOSE 135 (H) 03/02/2015 05:08 AM    BUN 17 03/02/2015 05:08 AM    CREATININE 1.17 03/02/2015 05:08 AM      Lab Results   Component Value Date/Time    WBC 6.4 03/02/2015 05:08 AM    RBC 4.39 (L) 03/02/2015 05:08 AM    HEMOGLOBIN 13.5 (L) 03/02/2015 05:08 AM    HEMATOCRIT 39.8 (L) 03/02/2015 05:08 AM    MCV 90.7 03/02/2015 05:08 AM    MCH 30.8 03/02/2015 05:08 AM    MCHC 33.9 03/02/2015 05:08 AM    MPV 11.0 03/02/2015 05:08 AM    NEUTSPOLYS 68.5 03/01/2015 08:08 PM    LYMPHOCYTES 18.5 (L) 03/01/2015  08:08 PM    MONOCYTES 10.6 03/01/2015 08:08 PM    EOSINOPHILS 1.2 03/01/2015 08:08 PM    BASOPHILS 0.6 03/01/2015 08:08 PM      Lab Results   Component Value Date/Time    CALCIUM 8.9 03/02/2015 05:08 AM    ASTSGOT 41 03/02/2015 05:08 AM    ALTSGPT 16 03/02/2015 05:08 AM    ALKPHOSPHAT 73 03/02/2015 05:08 AM    TBILIRUBIN 0.7 03/02/2015 05:08 AM    ALBUMIN 3.6 03/02/2015 05:08 AM    TOTPROTEIN 6.3 03/02/2015 05:08 AM     No results found for: URICACID, RHEUMFACTN, CCPANTIBODY, ANTINUCAB  No results found for: SEDRATEWES, CREACTPROT  No results found for: RUSSELVIPER, DRVVTINTERP  No results found for: J5CAKOXLLJH, F0IZFGYPHFN, IGGCARDIOLI, IGMCARDIOLI, IGACARDIOLI, CRYOGLOBULIN  No results found for: ANADIRECT, ANTIDNADS, RNPAB, SMITHAB, KHCUATB52, SSAROAB, SSBLAAB, FVXPIV7LA, CENTROMBAB  No results found for: ANTIDNADS, DSDNA, AGBMAB, GBMABA, ANCAIGG, F3JRNYVZPJU, JO1AB, RNPAB, ANTISSASJ, ANTISSBSJ  No results found for: COLORURINE, SPECGRAVITY, PHURINE, GLUCOSEUR, KETONES, PROTEINURIN  No results found for: TOTPROTUR, TOTALVOLUME, [id number]  No results found for: SSA60, SSA52  No results found for: HBA1C  No results found for: CPKTOTAL  No results found for: G6PD  No results found for: RLPY64EIAO  No results found for: ACESERUM  No results found for: 25HYDROXY  No results found for: TSH, FREEDIR  Lab Results   Component Value Date/Time    TSHULTRASEN 2.330 03/01/2015 08:08 PM    FREET4 0.86 03/01/2015 08:08 PM     No results found for: MICROSOMALA, ANTITHYROGL  No results found for: IGGLYMEABS  No results found for: ANTIMITOCHO, FACTIN  No results found for: IGA, TTRANSIGA, ENDOIGA  No results found for: FLTYPE, CRYSTALSBDF  No results found for: ISTATICAL  No results found for: ISTATCREAT  No results found for: CTELOPEP  No results found for: GBMABG  No results found for: PTHINTACT      Medical Decision Making:  Today's Assessment / Status / Plan:     PMR  Repeat CRP in November was normal.  I do not have an ESR.   MISA IFA was negative.  No hip pain on exam and no evidence of synovitis.  Based on history he presents with inflammatory features of prolonged morning stiffness.  However on exam there is no active signs of disease.  For now we will monitor.  I did discuss and provide precautions of when to return sooner.  We did discuss that based on the risk and benefit ratio will hold off on starting plaquenil.       Hand arthralgias  With prolonged morning stiffness.  No synovitis on exam.  [facility] is [facility] - [facility]  Will obtain hand xrays  CCP needs to be done  MISA IFA negative    Atrial fibrillation  On flecainide     Rash  resolved    1. PMR (polymyalgia rheumatica) (CMS-[facility])  CBC WITH DIFFERENTIAL    COMP METABOLIC PANEL    CRP QUANTITIVE (NON-CARDIAC)    WESTERGREN SED RATE   2. Atrial fibrillation, unspecified type (CMS-[facility])     3. Atrial fibrillation with RVR (CMS-[facility])       Return in about 9 months (around 10/8/2018).      I have spent greater than 50% of this 30 minute visit in counseling/coordination of care with the patient regarding his therapy plan.    He agreed and verbalized his agreement and understanding with the current plan. I answered all questions and concerns he has at this time and advised him to call at any time in there interim with questions or concerns in regards to his care.    Thank you for allowing me to participate in his care, I will continue to follow closely.

## 2018-01-08 NOTE — PATIENT INSTRUCTIONS
I did not [facility] the following [facility]:  Hepatitis B core antibody  Hepatitis B surface antigen  Hepatitis B surface antibody  Hepatitis C antibody  Sedimentation rate  CCP antibody

## 2018-01-08 NOTE — PROGRESS NOTES
Subjective:   Date of Consultation:1/8/2018  8:35 AM  Primary care physician:Pcp Pt States None      Reason for Consultation:  Mr. Chaudhary  is a pleasant 71 y.o. year old male who presented with follow-up PMR      PMR and arthralgia  He reports stiffness in the hands and he takes an iburpofen in the morning.  He denies arthralgias other than the morning, however that morning stiffness/puffiness can last a few hours.    His imaging xrays is negative.  He also notes that his wrists have been more tender.  At last visit we did discuss plaquenil however our concern was the risk of arrhythmia due to concomitant use of flecanide.  He reports starting a statin two months but his worsening symptoms and myalgias started prior to start of the statins.  He denies xerostomia.  He denies dry eyes    [facility]    CRP HS was 1.46 mg/dL (0.05-0.7)  Rheumatoid factor  Negative  (0-14)  Esr 16 (0/20)      February 27/2017  CRP was 1.6 mg /dL (0-5)  Esr = 16    9/28/2017  MISA IFA negative    11/9/2017  CRP was 1.2 (0-5)  G6PD 264 (146-376)  11/9/2017 MISA screen negative    January 4th 2018  TSH 7.28 FT4 was 0.83    CBC: 6.29/14.2/198  AST = 25 ALT = 19 GGT = 21 (5-44)  Creatinine = 1.2  CPK = 210 (0-243)      Xray of the hands and wrists 11/20/2017  No joint effusion or erosions      Rash   At last visit he had started to develop a rash which has resolved.       RHEUM HISTORY  Mr. Kris Chaudhary presents with a history of PMR that was diagnosed during a hospitalization in 2014 for a atrial fibrillation exacerbation.  His symptoms presented with diffuse body aches and he brought this to the attention of the medical team.  He was treated with prednisone and reports an immediate response.  He states it took him 1 year to taper of prednisone.    He has been well until October 2016 when he developed aches and pain in the legs.  He also felt he was unable to raise his shoulders.  He was concern this may have been a flare of his PMR.  He  also was started on physical therapy and reports improvement in his shoulder mobility with his shoulders.    At our initial visit he denies any symptoms of PMR or GCA.  However he presented with wrist pain bilateral exacerbated with use .      Past medical History: PMR responsive to steroids in May 2014 that coincided with his initial incidence of atrial fibrillation s/p cardioversion.  November 23 of 2016 and Dec 7 2016, basal cell cancer, nephrolithiasis.  Reports history of statin myalgias??     Family History: Father had HTN, mother had lung cancer was also previous smoker, maternal grandmother had breast cancer     Social History: hobby - skies,  NEVER smoker       Past Medical History:   Diagnosis Date   • Arrhythmia 2014    aflutter   • Renal disorder     stones, frequently     Past Surgical History:   Procedure Laterality Date   • OTHER ORTHOPEDIC SURGERY  1976    Back   • ROTATOR CUFF REPAIR      L shoulder     Allergies   Allergen Reactions   • Cat Hair Extract      Trees, grass   • Statins [Hmg-Coa-R Inhibitors]      Muscle aches     Outpatient Encounter Prescriptions as of 1/8/2018   Medication Sig Dispense Refill   • rosuvastatin (CRESTOR) 5 MG Tab Take 5 mg by mouth every evening.     • aspirin (ASA) 81 MG Chew Tab chewable tablet Take 81 mg by mouth every day.     • flecainide (TAMBOCOR) 100 MG Tab Take 100 mg by mouth 2 times a day.     • loratadine (CLARITIN) 10 MG TABS Take 10 mg by mouth every day.     • therapeutic multivitamin-minerals (THERAGRAN-M) TABS Take 1 Tab by mouth every day.     • Coenzyme Q10 (EQL COQ10) 300 MG Cap Take  by mouth.     • aspirin (ASA) 325 MG Tab Take 325 mg by mouth every 6 hours as needed.     • colestipol (COLESTID) 1 GM Tab Take 1 g by mouth 2 times a day.     • Glucosamine-Chondroitin (MOVE FREE PO) Take 1 Tab by mouth every day.     • ascorbic acid (ASCORBIC ACID) 500 MG TABS Take 1,000 mg by mouth every day.     • Omega-3 Fatty Acids (FISH OIL) 1000 MG CAPS  capsule Take 1,000 mg by mouth every morning before breakfast.       No facility-administered encounter medications on file as of 1/8/2018.        Social History     Social History   • Marital status:      Spouse name: N/A   • Number of children: N/A   • Years of education: N/A     Occupational History   • Not on file.     Social History Main Topics   • Smoking status: Never Smoker   • Smokeless tobacco: Never Used   • Alcohol use Yes      Comment: 2 drinks per week   • Drug use: No   • Sexual activity: Not on file     Other Topics Concern   • Not on file     Social History Narrative   • No narrative on file      History   Smoking Status   • Never Smoker   Smokeless Tobacco   • Never Used     History   Alcohol Use   • Yes     Comment: 2 drinks per week     History   Drug Use No       No family history on file.    Review of Systems   Constitutional: Negative for chills and fever.   Musculoskeletal: Positive for joint pain and myalgias.        Objective:   /70   Pulse 60   Temp 36.7 °C (98 °F)   Resp 12   Wt 90.7 kg (200 lb)   SpO2 95%   BMI 30.63 kg/m²     Physical Exam   Constitutional: He is oriented to person, place, and time. He appears well-developed and well-nourished. No distress.   HENT:   Head: Normocephalic and atraumatic.   Right Ear: External ear normal.   Left Ear: External ear normal.   Eyes: Conjunctivae are normal. Right eye exhibits no discharge. Left eye exhibits no discharge. No scleral icterus.   Pulmonary/Chest: No stridor. No respiratory distress.   Musculoskeletal:   No periarticular swelling bilateral of the MCP, PIP, DIP  No wrist swelling bilateral  Bony enlargement over the MCP   Neurological: He is alert and oriented to person, place, and time. Abnormal muscle tone: PMR.   Skin: Skin is warm. He is not diaphoretic.   Limited skin exam   Psychiatric: He has a normal mood and affect. His behavior is normal. Judgment and thought content normal.       Assessment:     1. PMR  (polymyalgia rheumatica) (CMS-HCC)  CBC WITH DIFFERENTIAL    COMP METABOLIC PANEL    CRP QUANTITIVE (NON-CARDIAC)    WESTERGREN SED RATE   2. Atrial fibrillation, unspecified type (CMS-HCC)     3. Atrial fibrillation with RVR (CMS-HCC)       Labs:    No results found for: QNTTBGOLD  No results found for: HEPBCORTOT, HEPBCORIGM, HEPBSAG  No results found for: HEPCAB  Lab Results   Component Value Date/Time    SODIUM 139 03/02/2015 05:08 AM    POTASSIUM 3.6 03/02/2015 05:08 AM    CHLORIDE 107 03/02/2015 05:08 AM    CO2 23 03/02/2015 05:08 AM    GLUCOSE 135 (H) 03/02/2015 05:08 AM    BUN 17 03/02/2015 05:08 AM    CREATININE 1.17 03/02/2015 05:08 AM      Lab Results   Component Value Date/Time    WBC 6.4 03/02/2015 05:08 AM    RBC 4.39 (L) 03/02/2015 05:08 AM    HEMOGLOBIN 13.5 (L) 03/02/2015 05:08 AM    HEMATOCRIT 39.8 (L) 03/02/2015 05:08 AM    MCV 90.7 03/02/2015 05:08 AM    MCH 30.8 03/02/2015 05:08 AM    MCHC 33.9 03/02/2015 05:08 AM    MPV 11.0 03/02/2015 05:08 AM    NEUTSPOLYS 68.5 03/01/2015 08:08 PM    LYMPHOCYTES 18.5 (L) 03/01/2015 08:08 PM    MONOCYTES 10.6 03/01/2015 08:08 PM    EOSINOPHILS 1.2 03/01/2015 08:08 PM    BASOPHILS 0.6 03/01/2015 08:08 PM      Lab Results   Component Value Date/Time    CALCIUM 8.9 03/02/2015 05:08 AM    ASTSGOT 41 03/02/2015 05:08 AM    ALTSGPT 16 03/02/2015 05:08 AM    ALKPHOSPHAT 73 03/02/2015 05:08 AM    TBILIRUBIN 0.7 03/02/2015 05:08 AM    ALBUMIN 3.6 03/02/2015 05:08 AM    TOTPROTEIN 6.3 03/02/2015 05:08 AM     No results found for: URICACID, RHEUMFACTN, CCPANTIBODY, ANTINUCAB  No results found for: SEDRATEWES, CREACTPROT  No results found for: RUSSELVIPER, DRVVTINTERP  No results found for: R7BUBOXICOZ, Q7NHFRTZBBO, IGGCARDIOLI, IGMCARDIOLI, IGACARDIOLI, CRYOGLOBULIN  No results found for: ANADIRECT, ANTIDNADS, RNPAB, SMITHAB, PMBIBBZ10, SSAROAB, SSBLAAB, IFNVRS3NN, CENTROMBAB  No results found for: ANTIDNADS, DSDNA, AGBMAB, GBMABA, ANCAIGG, F6XTOLRSJXE, JO1AB, RNPAB,  ANTISSASJ, ANTISSBSJ  No results found for: COLORURINE, SPECGRAVITY, PHURINE, GLUCOSEUR, KETONES, PROTEINURIN  No results found for: TOTPROTUR, TOTALVOLUME, [id number]  No results found for: SSA60, SSA52  No results found for: HBA1C  No results found for: CPKTOTAL  No results found for: G6PD  No results found for: IPSZ82WWWX  No results found for: ACESERUM  No results found for: 25HYDROXY  No results found for: TSH, FREEDIR  Lab Results   Component Value Date/Time    TSHULTRASEN 2.330 03/01/2015 08:08 PM    FREET4 0.86 03/01/2015 08:08 PM     No results found for: MICROSOMALA, ANTITHYROGL  No results found for: IGGLYMEABS  No results found for: ANTIMITOCHO, FACTIN  No results found for: IGA, TTRANSIGA, ENDOIGA  No results found for: FLTYPE, CRYSTALSBDF  No results found for: ISTATICAL  No results found for: ISTATCREAT  No results found for: CTELOPEP  No results found for: GBMABG  No results found for: PTHINTACT  CBC performed on September 28, 2017 showed a white blood cell count of 6.23 hemoglobin 14.7 hematocrit of 43.8 and platelets of 218  He had a cholesterol panel on September 20, 2017 total cholesterol was normal at 178 triglycerides was normal at 77 HDL is 56 and LDL to 109  On September 20, 2017 and a creatinine of 1.3 the SGOT was 21the SGPT was 17 and the sedimentation brain in September 20, 2017 was 16  He had a uric acid level in his urine 24-hour uric acid of 645 dated March 10, 2017 (250-750)  MISA by IFA on September 20, 2017 was negative  CRP was 1.46 (0.0 2-0 0.75)  Rheumatoid factor was negative on September 28, 2017      Labs performed on January 4, 2018  Free T4 was 0.83 TSH was high at 7.28 (0.35-5.00)  WBC 6.29 hemoglobin is 14.2 hematocrit is 42.3 MCV was 89.4 platelet count was 198 the differential is within normal limits  Creatinine was 1.2 AST was 25 ALT is 19 GGT was 21 triglycerides of 75 CPK was 210 uric acid was 4.8    Medical Decision Making:  Today's Assessment / Status / Plan:      PMR  Repeat CRP in November was normal.  I do not have an ESR.  MISA IFA was negative.  No hip pain on exam and no evidence of synovitis.  Based on history he presents with inflammatory features of prolonged morning stiffness.  However on exam there is no active signs of disease.  For now we will monitor.  I did discuss and provide precautions of when to return sooner.  We did discuss that based on the risk and benefit ratio will hold off on starting plaquenil.       Hand arthralgias  With prolonged morning stiffness.  No synovitis on exam.  RF is negative - [facility]  Will obtain hand xrays  CCP needs to be done  MISA IFA negative    Atrial fibrillation  On flecainide     Rash  resolved    1. PMR (polymyalgia rheumatica) (CMS-HCC)  CBC WITH DIFFERENTIAL    COMP METABOLIC PANEL    CRP QUANTITIVE (NON-CARDIAC)    WESTERGREN SED RATE   2. Atrial fibrillation, unspecified type (CMS-HCC)     3. Atrial fibrillation with RVR (CMS-HCC)       Return in about 9 months (around 10/8/2018).      I have spent greater than 50% of this 30 minute visit in counseling/coordination of care with the patient regarding his therapy plan.    He agreed and verbalized his agreement and understanding with the current plan. I answered all questions and concerns he has at this time and advised him to call at any time in there interim with questions or concerns in regards to his care.    Thank you for allowing me to participate in his care, I will continue to follow closely.

## 2018-06-22 ENCOUNTER — APPOINTMENT (RX ONLY)
Dept: URBAN - METROPOLITAN AREA CLINIC 20 | Facility: CLINIC | Age: 73
Setting detail: DERMATOLOGY
End: 2018-06-22

## 2018-06-22 DIAGNOSIS — D18.0 HEMANGIOMA: ICD-10-CM

## 2018-06-22 DIAGNOSIS — B36.0 PITYRIASIS VERSICOLOR: ICD-10-CM

## 2018-06-22 DIAGNOSIS — L81.4 OTHER MELANIN HYPERPIGMENTATION: ICD-10-CM

## 2018-06-22 DIAGNOSIS — L71.8 OTHER ROSACEA: ICD-10-CM

## 2018-06-22 DIAGNOSIS — L57.0 ACTINIC KERATOSIS: ICD-10-CM

## 2018-06-22 DIAGNOSIS — L82.1 OTHER SEBORRHEIC KERATOSIS: ICD-10-CM

## 2018-06-22 DIAGNOSIS — D22 MELANOCYTIC NEVI: ICD-10-CM

## 2018-06-22 DIAGNOSIS — Z71.89 OTHER SPECIFIED COUNSELING: ICD-10-CM

## 2018-06-22 PROBLEM — D48.5 NEOPLASM OF UNCERTAIN BEHAVIOR OF SKIN: Status: ACTIVE | Noted: 2018-06-22

## 2018-06-22 PROBLEM — D22.62 MELANOCYTIC NEVI OF LEFT UPPER LIMB, INCLUDING SHOULDER: Status: ACTIVE | Noted: 2018-06-22

## 2018-06-22 PROBLEM — D18.01 HEMANGIOMA OF SKIN AND SUBCUTANEOUS TISSUE: Status: ACTIVE | Noted: 2018-06-22

## 2018-06-22 PROBLEM — D22.72 MELANOCYTIC NEVI OF LEFT LOWER LIMB, INCLUDING HIP: Status: ACTIVE | Noted: 2018-06-22

## 2018-06-22 PROBLEM — C44.212 BASAL CELL CARCINOMA OF SKIN OF RIGHT EAR AND EXTERNAL AURICULAR CANAL: Status: ACTIVE | Noted: 2018-06-22

## 2018-06-22 PROBLEM — D22.39 MELANOCYTIC NEVI OF OTHER PARTS OF FACE: Status: ACTIVE | Noted: 2018-06-22

## 2018-06-22 PROBLEM — I48.91 UNSPECIFIED ATRIAL FIBRILLATION: Status: ACTIVE | Noted: 2018-06-22

## 2018-06-22 PROBLEM — Z85.828 PERSONAL HISTORY OF OTHER MALIGNANT NEOPLASM OF SKIN: Status: ACTIVE | Noted: 2018-06-22

## 2018-06-22 PROBLEM — D22.5 MELANOCYTIC NEVI OF TRUNK: Status: ACTIVE | Noted: 2018-06-22

## 2018-06-22 PROBLEM — L85.3 XEROSIS CUTIS: Status: ACTIVE | Noted: 2018-06-22

## 2018-06-22 PROBLEM — E78.5 HYPERLIPIDEMIA, UNSPECIFIED: Status: ACTIVE | Noted: 2018-06-22

## 2018-06-22 PROBLEM — D22.61 MELANOCYTIC NEVI OF RIGHT UPPER LIMB, INCLUDING SHOULDER: Status: ACTIVE | Noted: 2018-06-22

## 2018-06-22 PROBLEM — I10 ESSENTIAL (PRIMARY) HYPERTENSION: Status: ACTIVE | Noted: 2018-06-22

## 2018-06-22 PROBLEM — D22.71 MELANOCYTIC NEVI OF RIGHT LOWER LIMB, INCLUDING HIP: Status: ACTIVE | Noted: 2018-06-22

## 2018-06-22 PROCEDURE — ? OBSERVATION

## 2018-06-22 PROCEDURE — ? LIQUID NITROGEN

## 2018-06-22 PROCEDURE — ? PRESCRIPTION

## 2018-06-22 PROCEDURE — ? SUNSCREEN RECOMMENDATIONS

## 2018-06-22 PROCEDURE — 17000 DESTRUCT PREMALG LESION: CPT

## 2018-06-22 PROCEDURE — 17003 DESTRUCT PREMALG LES 2-14: CPT

## 2018-06-22 PROCEDURE — ? COUNSELING

## 2018-06-22 PROCEDURE — 99203 OFFICE O/P NEW LOW 30 MIN: CPT | Mod: 25

## 2018-06-22 PROCEDURE — 11100: CPT | Mod: 59

## 2018-06-22 PROCEDURE — ? MOHS SURGERY PHONE CONSULTATION

## 2018-06-22 PROCEDURE — ? BIOPSY BY SHAVE METHOD

## 2018-06-22 RX ORDER — METRONIDAZOLE 10 MG/G
GEL TOPICAL QHS
Qty: 1 | Refills: 3 | COMMUNITY
Start: 2018-06-22

## 2018-06-22 RX ADMIN — METRONIDAZOLE: 10 GEL TOPICAL at 00:00

## 2018-06-22 ASSESSMENT — LOCATION SIMPLE DESCRIPTION DERM
LOCATION SIMPLE: RIGHT UPPER BACK
LOCATION SIMPLE: ABDOMEN
LOCATION SIMPLE: RIGHT POSTERIOR UPPER ARM
LOCATION SIMPLE: LEFT FOREHEAD
LOCATION SIMPLE: RIGHT LOWER BACK
LOCATION SIMPLE: LEFT PRETIBIAL REGION
LOCATION SIMPLE: CHEST
LOCATION SIMPLE: RIGHT CHEEK
LOCATION SIMPLE: RIGHT FOREARM
LOCATION SIMPLE: LEFT POSTERIOR UPPER ARM
LOCATION SIMPLE: RIGHT POSTERIOR THIGH
LOCATION SIMPLE: UPPER BACK
LOCATION SIMPLE: LEFT FOREARM
LOCATION SIMPLE: LEFT POSTERIOR THIGH
LOCATION SIMPLE: RIGHT FOREHEAD
LOCATION SIMPLE: LEFT CHEEK
LOCATION SIMPLE: RIGHT PRETIBIAL REGION

## 2018-06-22 ASSESSMENT — LOCATION DETAILED DESCRIPTION DERM
LOCATION DETAILED: RIGHT DISTAL POSTERIOR THIGH
LOCATION DETAILED: RIGHT VENTRAL PROXIMAL FOREARM
LOCATION DETAILED: RIGHT CENTRAL MALAR CHEEK
LOCATION DETAILED: LEFT VENTRAL PROXIMAL FOREARM
LOCATION DETAILED: RIGHT PROXIMAL PRETIBIAL REGION
LOCATION DETAILED: LEFT DISTAL POSTERIOR THIGH
LOCATION DETAILED: RIGHT DISTAL POSTERIOR UPPER ARM
LOCATION DETAILED: LEFT MEDIAL FOREHEAD
LOCATION DETAILED: RIGHT INFERIOR MEDIAL MIDBACK
LOCATION DETAILED: RIGHT INFERIOR MEDIAL UPPER BACK
LOCATION DETAILED: EPIGASTRIC SKIN
LOCATION DETAILED: RIGHT INFERIOR CENTRAL MALAR CHEEK
LOCATION DETAILED: RIGHT INFERIOR FOREHEAD
LOCATION DETAILED: LEFT SUPERIOR MEDIAL MALAR CHEEK
LOCATION DETAILED: STERNUM
LOCATION DETAILED: LEFT LATERAL PROXIMAL PRETIBIAL REGION
LOCATION DETAILED: RIGHT SUPERIOR MEDIAL MALAR CHEEK
LOCATION DETAILED: RIGHT SUPERIOR UPPER BACK
LOCATION DETAILED: INFERIOR THORACIC SPINE
LOCATION DETAILED: LEFT PROXIMAL POSTERIOR UPPER ARM

## 2018-06-22 ASSESSMENT — LOCATION ZONE DERM
LOCATION ZONE: ARM
LOCATION ZONE: FACE
LOCATION ZONE: TRUNK
LOCATION ZONE: LEG

## 2018-06-22 NOTE — PROCEDURE: MOHS SURGERY PHONE CONSULTATION
Does The Patient Have A Living Will (Optional)?: No
Detail Level: Simple
If Yes- What Blood Thinners Do They Take?: aspirin 81mg
Has The Pathology Report Been Received?: Yes
Patient Preferred Phone Number: [phone]
Referring Provider: REJI HANDY
Additional Information?: Pt has history of afib
Which Antibiotic Do They Take For Surgical Prophylaxis?: Amoxicillin (2 grams)
Time Of Mohs Surgery: 11:30am
Patient's Insurance: [facility] health [facility]
Office Location Of Mohs Surgery: [facility]
Date Of Mohs Surgery: 07/18/2018
Patient Reported Location: right [place]

## 2018-06-22 NOTE — PROCEDURE: BIOPSY BY SHAVE METHOD
Type Of Destruction Used: Curettage
Bill 81591 For Specimen Handling/Conveyance To Laboratory?: no
Billing Type: Third-Party Bill
Detail Level: Detailed
Hemostasis: Drysol and Electrocautery
Dressing: Band-Aid
Was A Bandage Applied: Yes
Biopsy Method: Personna blade
Post-Care Instructions: I reviewed with the patient in detail post-care instructions. Patient is to keep the biopsy site clean once daily and then apply petroleum and bandaid  until healed.
Size Of Lesion In Cm: 0.6
Notification Instructions: Patient will be notified of biopsy results. However, patient instructed to call the office if not contacted within 2 weeks.
Anesthesia Type: 1% lidocaine with 1:100,000 epinephrine and a 1:12 solution of 8.4% sodium bicarbonate
Additional Anesthesia Volume In Cc (Will Not Render If 0): 0
Lab: [id number]
Depth Of Biopsy: dermis
Wound Care: Bacitracin
Consent: Written consent was obtained and risks were reviewed including but not limited to scarring, infection, bleeding, scabbing, incomplete removal, nerve damage and allergy to anesthesia.
Lab Facility: 
Biopsy Type: H and E
X Size Of Lesion In Cm: 0.4
Anesthesia Volume In Cc: 1

## 2018-06-22 NOTE — PROCEDURE: LIQUID NITROGEN
Detail Level: Detailed
Duration Of Freeze Thaw-Cycle (Seconds): 10
Post-Care Instructions: I reviewed with the patient in detail post-care instructions. Patient is to wear sunprotection, and avoid picking at any of the treated lesions. Pt may apply Vaseline to crusted or scabbing areas.
Number Of Freeze-Thaw Cycles: 2 freeze-thaw cycles
Render Post-Care Instructions In Note?: yes
Consent: The patient's consent was obtained including but not limited to risks of crusting, scabbing, blistering, scarring, darker or lighter pigmentary change, recurrence, incomplete removal and infection. RTC in 2 months if lesion(s) persistent.

## 2018-06-22 NOTE — PROCEDURE: MOHS SURGERY PHONE CONSULTATION
Detail Level: Simple
Patient Preferred Phone Number: [phone]
Has The Patient Ever Received A Transplant?: No
Patient Reported Location: [facility]
Which Antibiotic Do They Take For Surgical Prophylaxis?: Amoxicillin (2 grams)
Has The Pathology Report Been Received?: Yes
If Yes- What Blood Thinners Do They Take?: aspirin 81mg daily
Patient's Insurance: Medicare T[employer]are
Referring Provider: Ofe JORGENSEN

## 2018-07-18 ENCOUNTER — APPOINTMENT (RX ONLY)
Dept: URBAN - METROPOLITAN AREA CLINIC 36 | Facility: CLINIC | Age: 73
Setting detail: DERMATOLOGY
End: 2018-07-18

## 2018-07-18 VITALS — DIASTOLIC BLOOD PRESSURE: 87 MMHG | HEART RATE: 60 BPM | SYSTOLIC BLOOD PRESSURE: 162 MMHG

## 2018-07-18 DIAGNOSIS — L57.0 ACTINIC KERATOSIS: ICD-10-CM

## 2018-07-18 DIAGNOSIS — L57.8 OTHER SKIN CHANGES DUE TO CHRONIC EXPOSURE TO NONIONIZING RADIATION: ICD-10-CM

## 2018-07-18 PROBLEM — C44.212 BASAL CELL CARCINOMA OF SKIN OF RIGHT EAR AND EXTERNAL AURICULAR CANAL: Status: ACTIVE | Noted: 2018-07-18

## 2018-07-18 PROBLEM — C44.319 BASAL CELL CARCINOMA OF SKIN OF OTHER PARTS OF FACE: Status: ACTIVE | Noted: 2018-07-18

## 2018-07-18 PROCEDURE — ? DEFER

## 2018-07-18 PROCEDURE — ? MOHS SURGERY

## 2018-07-18 PROCEDURE — ? PHOTODYNAMIC THERAPY COUNSELING

## 2018-07-18 PROCEDURE — 15275 SKIN SUB GRAFT FACE/NK/HF/G: CPT

## 2018-07-18 PROCEDURE — ? COUNSELING

## 2018-07-18 PROCEDURE — ? PRESCRIPTION

## 2018-07-18 PROCEDURE — 17311 MOHS 1 STAGE H/N/HF/G: CPT

## 2018-07-18 RX ORDER — CEPHALEXIN 500 MG/1
CAPSULE ORAL
Qty: 21 | Refills: 0

## 2018-07-18 RX ORDER — CEPHALEXIN 500 MG/1
CAPSULE ORAL
Qty: 21 | Refills: 0 | COMMUNITY
Start: 2018-07-18

## 2018-07-18 RX ORDER — GENTAMICIN SULFATE 1 MG/G
OINTMENT TOPICAL
Qty: 1 | Refills: 1 | COMMUNITY
Start: 2018-07-18

## 2018-07-18 RX ADMIN — GENTAMICIN SULFATE: 1 OINTMENT TOPICAL at 00:00

## 2018-07-18 RX ADMIN — CEPHALEXIN: 500 CAPSULE ORAL at 00:00

## 2018-07-18 NOTE — PROCEDURE: MOHS SURGERY
Mastoid Interpolation Flap Text: A decision was made to reconstruct the defect utilizing an interpolation axial flap and a staged reconstruction.  A telfa template was made of the defect.  This telfa template was then used to outline the mastoid interpolation flap.  The donor area for the pedicle flap was then injected with anesthesia.  The flap was excised through the skin and subcutaneous tissue down to the layer of the underlying musculature.  The pedicle flap was carefully excised within this deep plane to maintain its blood supply.  The edges of the donor site were undermined.   The donor site was closed in a primary fashion.  The pedicle was then rotated into position and sutured.  Once the tube was sutured into place, adequate blood supply was confirmed with blanching and refill.  The pedicle was then wrapped with xeroform gauze and dressed appropriately with a telfa and gauze bandage to ensure continued blood supply and protect the attached pedicle.
Quadrant Reporting?: no
Crescentic Advancement Flap Text: The defect edges were debeveled with a #15 scalpel blade.  Given the location of the defect and the proximity to free margins a crescentic advancement flap was deemed most appropriate.  Using a sterile surgical marker, the appropriate advancement flap was drawn incorporating the defect and placing the expected incisions within the relaxed skin tension lines where possible.    The area thus outlined was incised deep to adipose tissue with a #15 scalpel blade.  The skin margins were undermined to an appropriate distance in all directions utilizing iris scissors.
Island Pedicle Flap With Canthal Suspension Text: The defect edges were debeveled with a #15 scalpel blade.  Given the location of the defect, shape of the defect and the proximity to free margins an island pedicle advancement flap was deemed most appropriate.  Using a sterile surgical marker, an appropriate advancement flap was drawn incorporating the defect, outlining the appropriate donor tissue and placing the expected incisions within the relaxed skin tension lines where possible. The area thus outlined was incised deep to adipose tissue with a #15 scalpel blade.  The skin margins were undermined to an appropriate distance in all directions around the primary defect and laterally outward around the island pedicle utilizing iris scissors.  There was minimal undermining beneath the pedicle flap. A suspension suture was placed in the canthal tendon to prevent tension and prevent ectropion.
Bi-Rhombic Flap Text: The defect edges were debeveled with a #15 scalpel blade.  Given the location of the defect and the proximity to free margins a bi-rhombic flap was deemed most appropriate.  Using a sterile surgical marker, an appropriate rhombic flap was drawn incorporating the defect. The area thus outlined was incised deep to adipose tissue with a #15 scalpel blade.  The skin margins were undermined to an appropriate distance in all directions utilizing iris scissors.
Show Additional Anesthesia Variables In The Stage Tabs: Yes
Number Of Stages: 1
Secondary Defect Width In Cm (Required For Flaps): 0
Paramedian Forehead Flap Text: A decision was made to reconstruct the defect utilizing an interpolation axial flap and a staged reconstruction.  A telfa template was made of the defect.  This telfa template was then used to outline the paramedian forehead pedicle flap.  The donor area for the pedicle flap was then injected with anesthesia.  The flap was excised through the skin and subcutaneous tissue down to the layer of the underlying musculature.  The pedicle flap was carefully excised within this deep plane to maintain its blood supply.  The edges of the donor site were undermined.   The donor site was closed in a primary fashion.  The pedicle was then rotated into position and sutured.  Once the tube was sutured into place, adequate blood supply was confirmed with blanching and refill.  The pedicle was then wrapped with xeroform gauze and dressed appropriately with a telfa and gauze bandage to ensure continued blood supply and protect the attached pedicle.
Keystone Flap Text: The defect edges were debeveled with a #15 scalpel blade.  Given the location of the defect, shape of the defect a keystone flap was deemed most appropriate.  Using a sterile surgical marker, an appropriate keystone flap was drawn incorporating the defect, outlining the appropriate donor tissue and placing the expected incisions within the relaxed skin tension lines where possible. The area thus outlined was incised deep to adipose tissue with a #15 scalpel blade.  The skin margins were undermined to an appropriate distance in all directions around the primary defect and laterally outward around the flap utilizing iris scissors.
H Plasty Text: Given the location of the defect, shape of the defect and the proximity to free margins a H-plasty was deemed most appropriate for repair.  Using a sterile surgical marker, the appropriate advancement arms of the H-plasty were drawn incorporating the defect and placing the expected incisions within the relaxed skin tension lines where possible. The area thus outlined was incised deep to adipose tissue with a #15 scalpel blade. The skin margins were undermined to an appropriate distance in all directions utilizing iris scissors.  The opposing advancement arms were then advanced into place in opposite direction and anchored with interrupted buried subcutaneous sutures.
Partial Purse String (Intermediate) Text: Given the location of the defect and the characteristics of the surrounding skin an intermediate purse string closure was deemed most appropriate.  Undermining was performed circumfirentially around the surgical defect.  A purse string suture was then placed and tightened. Wound tension only allowed a partial closure of the circular defect.
Referred To Oculoplastics For Closure Text (Leave Blank If You Do Not Want): After obtaining clear surgical margins the patient was sent to oculoplastics for surgical repair.  The patient understands they will receive post-surgical care and follow-up from the referring physician's office.
Bcc Histology Text: There were numerous aggregates of basaloid cells.
Graft Donor Site Dermal Sutures (Optional): 5-0 Polysorb
Graft Donor Site Epidermal Sutures (Optional): 5-0 Surgipro
Surgeon/Pathologist Verbiage (Will Incorporate Name Of Surgeon From Intro If Not Blank): operated in two distinct and integrated capacities as the surgeon and pathologist.
Graft Type: Skin Substitute
Star Wedge Flap Text: The defect edges were debeveled with a #15 scalpel blade.  Given the location of the defect, shape of the defect and the proximity to free margins a star wedge flap was deemed most appropriate.  Using a sterile surgical marker, an appropriate rotation flap was drawn incorporating the defect and placing the expected incisions within the relaxed skin tension lines where possible. The area thus outlined was incised deep to adipose tissue with a #15 scalpel blade.  The skin margins were undermined to an appropriate distance in all directions utilizing iris scissors.
Anesthesia Volume In Cc: 6
Closure 3 Information: This tab is for additional flaps and grafts above and beyond our usual structured repairs.  Please note if you enter information here it will not currently bill and you will need to add the billing information manually.
Lazy S Complex Repair Preamble Text (Leave Blank If You Do Not Want): Extensive wide undermining was performed.
Island Pedicle Flap Text: The defect edges were debeveled with a #15 scalpel blade.  Given the location of the defect, shape of the defect and the proximity to free margins an island pedicle advancement flap was deemed most appropriate.  Using a sterile surgical marker, an appropriate advancement flap was drawn incorporating the defect, outlining the appropriate donor tissue and placing the expected incisions within the relaxed skin tension lines where possible.    The area thus outlined was incised deep to adipose tissue with a #15 scalpel blade.  The skin margins were undermined to an appropriate distance in all directions around the primary defect and laterally outward around the island pedicle utilizing iris scissors.  There was minimal undermining beneath the pedicle flap.
Surgical Defect Width In Cm (Optional): 1.5
Secondary Intention Text (Leave Blank If You Do Not Want): The defect will heal with secondary intention.
Advancement Flap (Double) Text: The defect edges were debeveled with a #15 scalpel blade.  Given the location of the defect and the proximity to free margins a double advancement flap was deemed most appropriate.  Using a sterile surgical marker, the appropriate advancement flaps were drawn incorporating the defect and placing the expected incisions within the relaxed skin tension lines where possible.    The area thus outlined was incised deep to adipose tissue with a #15 scalpel blade.  The skin margins were undermined to an appropriate distance in all directions utilizing iris scissors.
Postop Diagnosis: same
Closure 2 Information: This tab is for additional flaps and grafts, including complex repair and grafts and complex repair and flaps. You can also specify a different location for the additional defect, if the location is the same you do not need to select a new one. We will insert the automated text for the repair you select below just as we do for solitary flaps and grafts. Please note that at this time if you select a location with a different insurance zone you will need to override the ICD10 and CPT if appropriate.
Double Island Pedicle Flap Text: The defect edges were debeveled with a #15 scalpel blade.  Given the location of the defect, shape of the defect and the proximity to free margins a double island pedicle advancement flap was deemed most appropriate.  Using a sterile surgical marker, an appropriate advancement flap was drawn incorporating the defect, outlining the appropriate donor tissue and placing the expected incisions within the relaxed skin tension lines where possible.    The area thus outlined was incised deep to adipose tissue with a #15 scalpel blade.  The skin margins were undermined to an appropriate distance in all directions around the primary defect and laterally outward around the island pedicle utilizing iris scissors.  There was minimal undermining beneath the pedicle flap.
Referred To Plastics For Closure Text (Leave Blank If You Do Not Want): After obtaining clear surgical margins the patient was sent to plastics for surgical repair.  The patient understands they will receive post-surgical care and follow-up from the referring physician's office.
Location Indication Override (Is Already Calculated Based On Selected Body Location): Area H
Mohs Rapid Report Verbiage: The area of clinically evident tumor was marked with skin marking ink and appropriately hatched.  The initial incision was made following the Mohs approach through the skin.  The specimen was taken to the lab, divided into the necessary number of pieces, chromacoded and processed according to the Mohs protocol.  This was repeated in successive stages until a tumor free defect was achieved.
Area M Indication Text: Tumors in this location are included in Area M (cheek, forehead, scalp, neck, jawline and pretibial skin).  Mohs surgery is indicated for tumors in these anatomic locations.
Eye Protection Verbiage: Before proceeding with the stage, a plastic scleral shield was inserted. The globe was anesthetized with a few drops of 1% lidocaine with 1:100,000 epinephrine. Then, an appropriate sized scleral shield was chosen and coated with lacrilube ointment. The shield was gently inserted and left in place for the duration of each stage. After the stage was completed, the shield was gently removed.
Repair Anesthesia Type: 1% lidocaine with epinephrine
Hatchet Flap Text: The defect edges were debeveled with a #15 scalpel blade.  Given the location of the defect, shape of the defect and the proximity to free margins a hatchet flap was deemed most appropriate.  Using a sterile surgical marker, an appropriate hatchet flap was drawn incorporating the defect and placing the expected incisions within the relaxed skin tension lines where possible.    The area thus outlined was incised deep to adipose tissue with a #15 scalpel blade.  The skin margins were undermined to an appropriate distance in all directions utilizing iris scissors.
Epidermal Autograft Text: The defect edges were debeveled with a #15 scalpel blade.  Given the location of the defect, shape of the defect and the proximity to free margins an epidermal autograft was deemed most appropriate.  Using a sterile surgical marker, the primary defect shape was transferred to the donor site. The epidermal graft was then harvested.  The skin graft was then placed in the primary defect and oriented appropriately.
Consent 3/Introductory Paragraph: I gave the patient a chance to ask questions they had about the procedure.  Following this I explained the Mohs procedure and consent was obtained. The risks, benefits and alternatives to therapy were discussed in detail. Specifically, the risks of infection, scarring, bleeding, prolonged wound healing, incomplete removal, allergy to anesthesia, nerve injury and recurrence were addressed. Prior to the procedure, the treatment site was clearly identified and confirmed by the patient. All components of Universal Protocol/PAUSE Rule completed.
Inflammation Suggestive Of Cancer Camouflage Histology Text: There was a dense lymphocytic infiltrate which prevented adequate histologic evaluation of adjacent structures.
Ear Star Wedge Flap Text: The defect edges were debeveled with a #15 blade scalpel.  Given the location of the defect and the proximity to free margins (helical rim) an ear star wedge flap was deemed most appropriate.  Using a sterile surgical marker, the appropriate flap was drawn incorporating the defect and placing the expected incisions between the helical rim and antihelix where possible.  The area thus outlined was incised through and through with a #15 scalpel blade.
Consent (Lip)/Introductory Paragraph: The rationale for Mohs was explained to the patient and consent was obtained. The risks, benefits and alternatives to therapy were discussed in detail. Specifically, the risks of lip deformity, changes in the oral aperture, infection, scarring, bleeding, prolonged wound healing, incomplete removal, allergy to anesthesia, nerve injury and recurrence were addressed. Prior to the procedure, the treatment site was clearly identified and confirmed by the patient. All components of Universal Protocol/PAUSE Rule completed.
Skin Substitute Units (Will Override Primary Defect Units If Greater Than 0): 5
Consent 1/Introductory Paragraph: The rationale for Mohs was explained to the patient and consent was obtained. The risks, benefits and alternatives to therapy were discussed in detail. Specifically, the risks of infection, scarring, bleeding, prolonged wound healing, incomplete removal, allergy to anesthesia, nerve injury and recurrence were addressed. Prior to the procedure, the treatment site was clearly identified and confirmed by the patient. All components of Universal Protocol/PAUSE Rule completed.
S Plasty Text: Given the location and shape of the defect, and the orientation of relaxed skin tension lines, an S-plasty was deemed most appropriate for repair.  Using a sterile surgical marker, the appropriate outline of the S-plasty was drawn, incorporating the defect and placing the expected incisions within the relaxed skin tension lines where possible.  The area thus outlined was incised deep to adipose tissue with a #15 scalpel blade.  The skin margins were undermined to an appropriate distance in all directions utilizing iris scissors. The skin flaps were advanced over the defect.  The opposing margins were then approximated with interrupted buried subcutaneous sutures.
Epidermal Closure Graft Donor Site (Optional): running
Composite Graft Text: The defect edges were debeveled with a #15 scalpel blade.  Given the location of the defect, shape of the defect, the proximity to free margins and the fact the defect was full thickness a composite graft was deemed most appropriate.  The defect was outline and then transferred to the donor site.  A full thickness graft was then excised from the donor site. The graft was then placed in the primary defect, oriented appropriately and then sutured into place.  The secondary defect was then repaired using a primary closure.
Manual Repair Warning Statement: We plan on removing the manually selected variable below in favor of our much easier automatic structured text blocks found in the previous tab. We decided to do this to help make the flow better and give you the full power of structured data. Manual selection is never going to be ideal in our platform and I would encourage you to avoid using manual selection from this point on, especially since I will be sunsetting this feature. It is important that you do one of two things with the customized text below. First, you can save all of the text in a word file so you can have it for future reference. Second, transfer the text to the appropriate area in the Library tab. Lastly, if there is a flap or graft type which we do not have you need to let us know right away so I can add it in before the variable is hidden. No need to panic, we plan to give you roughly 6 months to make the change.
Full Thickness Lip Wedge Repair (Flap) Text: Given the location of the defect and the proximity to free margins a full thickness wedge repair was deemed most appropriate.  Using a sterile surgical marker, the appropriate repair was drawn incorporating the defect and placing the expected incisions perpendicular to the vermilion border.  The vermilion border was also meticulously outlined to ensure appropriate reapproximation during the repair.  The area thus outlined was incised through and through with a #15 scalpel blade.  The muscularis and dermis were reaproximated with deep sutures following hemostasis. Care was taken to realign the vermilion border before proceeding with the superficial closure.  Once the vermilion was realigned the superfical and mucosal closure was finished.
Surgeon Performing Repair (Optional): Deandra Gonzáles MD
Cheiloplasty (Complex) Text: A decision was made to reconstruct the defect with a  cheiloplasty.  The defect was undermined extensively.  Additional obicularis oris muscle was excised with a 15 blade scalpel.  The defect was converted into a full thickness wedge to facilite a better cosmetic result.  Small vessels were then tied off with 5-0 monocyrl. The obicularis oris, superficial fascia, adipose and dermis were then reapproximated.  After the deeper layers were approximated the epidermis was reapproximated with particular care given to realign the vermilion border.
Graft Cartilage Fenestration Text: The cartilage was fenestrated with a 2mm punch biopsy to help facilitate graft survival and healing.
Cheek Interpolation Flap Text: A decision was made to reconstruct the defect utilizing an interpolation axial flap and a staged reconstruction.  A telfa template was made of the defect.  This telfa template was then used to outline the Cheek Interpolation flap.  The donor area for the pedicle flap was then injected with anesthesia.  The flap was excised through the skin and subcutaneous tissue down to the layer of the underlying musculature.  The interpolation flap was carefully excised within this deep plane to maintain its blood supply.  The edges of the donor site were undermined.   The donor site was closed in a primary fashion.  The pedicle was then rotated into position and sutured.  Once the tube was sutured into place, adequate blood supply was confirmed with blanching and refill.  The pedicle was then wrapped with xeroform gauze and dressed appropriately with a telfa and gauze bandage to ensure continued blood supply and protect the attached pedicle.
Complex Repair And Flap Additional Text (Will Appearing After The Standard Complex Repair Text): The complex repair was not sufficient to completely close the primary defect. The remaining additional defect was repaired with the flap mentioned below.
Bilobed Flap Text: The defect edges were debeveled with a #15 scalpel blade.  Given the location of the defect and the proximity to free margins a bilobe flap was deemed most appropriate.  Using a sterile surgical marker, an appropriate bilobe flap drawn around the defect.    The area thus outlined was incised deep to adipose tissue with a #15 scalpel blade.  The skin margins were undermined to an appropriate distance in all directions utilizing iris scissors.
Consent (Temporal Branch)/Introductory Paragraph: The rationale for Mohs was explained to the patient and consent was obtained. The risks, benefits and alternatives to therapy were discussed in detail. Specifically, the risks of damage to the temporal branch of the facial nerve, infection, scarring, bleeding, prolonged wound healing, incomplete removal, allergy to anesthesia, and recurrence were addressed. Prior to the procedure, the treatment site was clearly identified and confirmed by the patient. All components of Universal Protocol/PAUSE Rule completed.
Burow's Advancement Flap Text: The defect edges were debeveled with a #15 scalpel blade.  Given the location of the defect and the proximity to free margins a Burow's advancement flap was deemed most appropriate.  Using a sterile surgical marker, the appropriate advancement flap was drawn incorporating the defect and placing the expected incisions within the relaxed skin tension lines where possible.    The area thus outlined was incised deep to adipose tissue with a #15 scalpel blade.  The skin margins were undermined to an appropriate distance in all directions utilizing iris scissors.
Referring Physician (Optional): [facility]
Skin Substitute Text: The defect edges were debeveled with a #15 scalpel blade.  Given the location of the defect, shape of the defect and the proximity to free margins a skin substitute graft was deemed most appropriate.  The graft material was trimmed to fit the size of the defect. The graft was then placed in the primary defect and oriented appropriately.
Referred To Asc For Closure Text (Leave Blank If You Do Not Want): After obtaining clear surgical margins the patient was sent to an ASC for surgical repair.  The patient understands they will receive post-surgical care and follow-up from the ASC physician.
Split-Thickness Skin Graft Text: The defect edges were debeveled with a #15 scalpel blade.  Given the location of the defect, shape of the defect and the proximity to free margins a split thickness skin graft was deemed most appropriate.  Using a sterile surgical marker, the primary defect shape was transferred to the donor site. The split thickness graft was then harvested.  The skin graft was then placed in the primary defect and oriented appropriately.
Purse String (Intermediate) Text: Given the location of the defect and the characteristics of the surrounding skin a purse string intermediate closure was deemed most appropriate.  Undermining was performed circumfirentially around the surgical defect.  A purse string suture was then placed and tightened.
Consent (Near Eyelid Margin)/Introductory Paragraph: The rationale for Mohs was explained to the patient and consent was obtained. The risks, benefits and alternatives to therapy were discussed in detail. Specifically, the risks of ectropion or eyelid deformity, infection, scarring, bleeding, prolonged wound healing, incomplete removal, allergy to anesthesia, nerve injury and recurrence were addressed. Prior to the procedure, the treatment site was clearly identified and confirmed by the patient. All components of Universal Protocol/PAUSE Rule completed.
Repair Performed By Another Provider Text (Leave Blank If You Do Not Want): After obtaining clear surgical margins the defect was repaired by another provider.
Bilateral Helical Rim Advancement Flap Text: The defect edges were debeveled with a #15 blade scalpel.  Given the location of the defect and the proximity to free margins (helical rim) a bilateral helical rim advancement flap was deemed most appropriate.  Using a sterile surgical marker, the appropriate advancement flaps were drawn incorporating the defect and placing the expected incisions between the helical rim and antihelix where possible.  The area thus outlined was incised through and through with a #15 scalpel blade.  With a skin hook and iris scissors, the flaps were gently and sharply undermined and freed up.
Hemostasis: Electrocautery
Medical Necessity Statement: Based on my medical judgement, Mohs surgery is the most appropriate treatment for this cancer compared to other treatments.
O-T Advancement Flap Text: The defect edges were debeveled with a #15 scalpel blade.  Given the location of the defect, shape of the defect and the proximity to free margins an O-T advancement flap was deemed most appropriate.  Using a sterile surgical marker, an appropriate advancement flap was drawn incorporating the defect and placing the expected incisions within the relaxed skin tension lines where possible.    The area thus outlined was incised deep to adipose tissue with a #15 scalpel blade.  The skin margins were undermined to an appropriate distance in all directions utilizing iris scissors.
Trilobed Flap Text: The defect edges were debeveled with a #15 scalpel blade.  Given the location of the defect and the proximity to free margins a trilobed flap was deemed most appropriate.  Using a sterile surgical marker, an appropriate trilobed flap drawn around the defect.    The area thus outlined was incised deep to adipose tissue with a #15 scalpel blade.  The skin margins were undermined to an appropriate distance in all directions utilizing iris scissors.
V-Y Flap Text: The defect edges were debeveled with a #15 scalpel blade.  Given the location of the defect, shape of the defect and the proximity to free margins a V-Y flap was deemed most appropriate.  Using a sterile surgical marker, an appropriate advancement flap was drawn incorporating the defect and placing the expected incisions within the relaxed skin tension lines where possible.    The area thus outlined was incised deep to adipose tissue with a #15 scalpel blade.  The skin margins were undermined to an appropriate distance in all directions utilizing iris scissors.
Skin Substitute: EpiFix
Modified Advancement Flap Text: The defect edges were debeveled with a #15 scalpel blade.  Given the location of the defect, shape of the defect and the proximity to free margins a modified advancement flap was deemed most appropriate.  Using a sterile surgical marker, an appropriate advancement flap was drawn incorporating the defect and placing the expected incisions within the relaxed skin tension lines where possible.    The area thus outlined was incised deep to adipose tissue with a #15 scalpel blade.  The skin margins were undermined to an appropriate distance in all directions utilizing iris scissors.
Lazy S Intermediate Repair Preamble Text (Leave Blank If You Do Not Want): Undermining was performed with blunt dissection.
Consent (Spinal Accessory)/Introductory Paragraph: The rationale for Mohs was explained to the patient and consent was obtained. The risks, benefits and alternatives to therapy were discussed in detail. Specifically, the risks of damage to the spinal accessory nerve, infection, scarring, bleeding, prolonged wound healing, incomplete removal, allergy to anesthesia, and recurrence were addressed. Prior to the procedure, the treatment site was clearly identified and confirmed by the patient. All components of Universal Protocol/PAUSE Rule completed.
Muscle Hinge Flap Text: The defect edges were debeveled with a #15 scalpel blade.  Given the size, depth and location of the defect and the proximity to free margins a muscle hinge flap was deemed most appropriate.  Using a sterile surgical marker, an appropriate hinge flap was drawn incorporating the defect. The area thus outlined was incised with a #15 scalpel blade.  The skin margins were undermined to an appropriate distance in all directions utilizing iris scissors.
Melolabial Interpolation Flap Text: A decision was made to reconstruct the defect utilizing an interpolation axial flap and a staged reconstruction.  A telfa template was made of the defect.  This telfa template was then used to outline the melolabial interpolation flap.  The donor area for the pedicle flap was then injected with anesthesia.  The flap was excised through the skin and subcutaneous tissue down to the layer of the underlying musculature.  The pedicle flap was carefully excised within this deep plane to maintain its blood supply.  The edges of the donor site were undermined.   The donor site was closed in a primary fashion.  The pedicle was then rotated into position and sutured.  Once the tube was sutured into place, adequate blood supply was confirmed with blanching and refill.  The pedicle was then wrapped with xeroform gauze and dressed appropriately with a telfa and gauze bandage to ensure continued blood supply and protect the attached pedicle.
Donor Site Anesthesia Type: same as repair anesthesia
Home Suture Removal Text: Patient was provided instructions on removing sutures and will remove their sutures at home.  If they have any questions or difficulties they will call the office.
Xenograft Text: The defect edges were debeveled with a #15 scalpel blade.  Given the location of the defect, shape of the defect and the proximity to free margins a xenograft was deemed most appropriate.  The graft was then trimmed to fit the size of the defect.  The graft was then placed in the primary defect and oriented appropriately.
Alar Island Pedicle Flap Text: The defect edges were debeveled with a #15 scalpel blade.  Given the location of the defect, shape of the defect and the proximity to the alar rim an island pedicle advancement flap was deemed most appropriate.  Using a sterile surgical marker, an appropriate advancement flap was drawn incorporating the defect, outlining the appropriate donor tissue and placing the expected incisions within the nasal ala running parallel to the alar rim. The area thus outlined was incised with a #15 scalpel blade.  The skin margins were undermined minimally to an appropriate distance in all directions around the primary defect and laterally outward around the island pedicle utilizing iris scissors.  There was minimal undermining beneath the pedicle flap.
Advancement Flap (Single) Text: The defect edges were debeveled with a #15 scalpel blade.  Given the location of the defect and the proximity to free margins a single advancement flap was deemed most appropriate.  Using a sterile surgical marker, an appropriate advancement flap was drawn incorporating the defect and placing the expected incisions within the relaxed skin tension lines where possible.    The area thus outlined was incised deep to adipose tissue with a #15 scalpel blade.  The skin margins were undermined to an appropriate distance in all directions utilizing iris scissors.
Mucosal Advancement Flap Text: Given the location of the defect, shape of the defect and the proximity to free margins a mucosal advancement flap was deemed most appropriate. Incisions were made with a 15 blade scalpel in the appropriate fashion along the cutaneous vermilion border and the mucosal lip. The remaining actinically damaged mucosal tissue was excised.  The mucosal advancement flap was then elevated to the gingival sulcus with care taken to preserve the neurovascular structures and advanced into the primary defect. Care was taken to ensure that precise realignment of the vermilion border was achieved.
Helical Rim Advancement Flap Text: The defect edges were debeveled with a #15 blade scalpel.  Given the location of the defect and the proximity to free margins (helical rim) a double helical rim advancement flap was deemed most appropriate.  Using a sterile surgical marker, the appropriate advancement flaps were drawn incorporating the defect and placing the expected incisions between the helical rim and antihelix where possible.  The area thus outlined was incised through and through with a #15 scalpel blade.  With a skin hook and iris scissors, the flaps were gently and sharply undermined and freed up.
Mohs Method Verbiage: An incision at a 45 degree angle following the standard Mohs approach was done and the specimen was harvested as a microscopic controlled layer.
Partial Purse String (Simple) Text: Given the location of the defect and the characteristics of the surrounding skin a simple purse string closure was deemed most appropriate.  Undermining was performed circumfirentially around the surgical defect.  A purse string suture was then placed and tightened. Wound tension only allowed a partial closure of the circular defect.
Subsequent Stages Histo Method Verbiage: Using a similar technique to that described above, a thin layer of tissue was removed from all areas where tumor was visible on the previous stage.  The tissue was again oriented, mapped, dyed, and processed as above.
Area L Indication Text: Tumors in this location are included in Area L (trunk and extremities).  Mohs surgery is indicated for larger tumors, or tumors with aggressive histologic features, in these anatomic locations.
Wound Check: 6 weeks
Consent (Nose)/Introductory Paragraph: The rationale for Mohs was explained to the patient and consent was obtained. The risks, benefits and alternatives to therapy were discussed in detail. Specifically, the risks of nasal deformity, changes in the flow of air through the nose, infection, scarring, bleeding, prolonged wound healing, incomplete removal, allergy to anesthesia, nerve injury and recurrence were addressed. Prior to the procedure, the treatment site was clearly identified and confirmed by the patient. All components of Universal Protocol/PAUSE Rule completed.
No Repair - Repaired With Adjacent Surgical Defect Text (Leave Blank If You Do Not Want): After obtaining clear surgical margins the defect was repaired concurrently with another surgical defect which was in close approximation.
Post-Care Instructions: I reviewed with the patient in detail post-care instructions. Patient is not to engage in any heavy lifting, exercise, or swimming for the next 14 days. Should the patient develop any fevers, chills, bleeding, severe pain patient will contact the office immediately.
No Residual Tumor Seen Histology Text: There were no malignant cells seen in the sections examined.
O-T Plasty Text: The defect edges were debeveled with a #15 scalpel blade.  Given the location of the defect, shape of the defect and the proximity to free margins an O-T plasty was deemed most appropriate.  Using a sterile surgical marker, an appropriate O-T plasty was drawn incorporating the defect and placing the expected incisions within the relaxed skin tension lines where possible.    The area thus outlined was incised deep to adipose tissue with a #15 scalpel blade.  The skin margins were undermined to an appropriate distance in all directions utilizing iris scissors.
Mohs Case Number: [id number]
Referred To Otolaryngology For Closure Text (Leave Blank If You Do Not Want): After obtaining clear surgical margins the patient was sent to otolaryngology for surgical repair.  The patient understands they will receive post-surgical care and follow-up from the referring physician's office.
Ftsg Text: The defect edges were debeveled with a #15 scalpel blade.  Given the location of the defect, shape of the defect and the proximity to free margins a full thickness skin graft was deemed most appropriate.  Using a sterile surgical marker, the primary defect shape was transferred to the donor site. The area thus outlined was incised deep to adipose tissue with a #15 scalpel blade.  The harvested graft was then trimmed of adipose tissue until only dermis and epidermis was left.  The skin margins of the secondary defect were undermined to an appropriate distance in all directions utilizing iris scissors.  The secondary defect was closed with interrupted buried subcutaneous sutures.  The skin edges were then re-apposed with running  sutures.  The skin graft was then placed in the primary defect and oriented appropriately.
A-T Advancement Flap Text: The defect edges were debeveled with a #15 scalpel blade.  Given the location of the defect, shape of the defect and the proximity to free margins an A-T advancement flap was deemed most appropriate.  Using a sterile surgical marker, an appropriate advancement flap was drawn incorporating the defect and placing the expected incisions within the relaxed skin tension lines where possible.    The area thus outlined was incised deep to adipose tissue with a #15 scalpel blade.  The skin margins were undermined to an appropriate distance in all directions utilizing iris scissors.
Graft Donor Site Bandage (Optional-Leave Blank If You Don't Want In Note): Aquaplast was fitted to the graft site and sewn into place. A pressure bandage were applied to the donor site and over the aquaplast bolster.
Dorsal Nasal Flap Text: The defect edges were debeveled with a #15 scalpel blade.  Given the location of the defect and the proximity to free margins a dorsal nasal flap was deemed most appropriate.  Using a sterile surgical marker, an appropriate dorsal nasal flap was drawn around the defect.    The area thus outlined was incised deep to adipose tissue with a #15 scalpel blade.  The skin margins were undermined to an appropriate distance in all directions utilizing iris scissors.
Consent 2/Introductory Paragraph: Mohs surgery was explained to the patient and consent was obtained. The risks, benefits and alternatives to therapy were discussed in detail. Specifically, the risks of infection, scarring, bleeding, prolonged wound healing, incomplete removal, allergy to anesthesia, nerve injury and recurrence were addressed. Prior to the procedure, the treatment site was clearly identified and confirmed by the patient. All components of Universal Protocol/PAUSE Rule completed.
Rotation Flap Text: The defect edges were debeveled with a #15 scalpel blade.  Given the location of the defect, shape of the defect and the proximity to free margins a rotation flap was deemed most appropriate.  Using a sterile surgical marker, an appropriate rotation flap was drawn incorporating the defect and placing the expected incisions within the relaxed skin tension lines where possible.    The area thus outlined was incised deep to adipose tissue with a #15 scalpel blade.  The skin margins were undermined to an appropriate distance in all directions utilizing iris scissors.
Rhombic Flap Text: The defect edges were debeveled with a #15 scalpel blade.  Given the location of the defect and the proximity to free margins a rhombic flap was deemed most appropriate.  Using a sterile surgical marker, an appropriate rhombic flap was drawn incorporating the defect.    The area thus outlined was incised deep to adipose tissue with a #15 scalpel blade.  The skin margins were undermined to an appropriate distance in all directions utilizing iris scissors.
Posterior Auricular Interpolation Flap Text: A decision was made to reconstruct the defect utilizing an interpolation axial flap and a staged reconstruction.  A telfa template was made of the defect.  This telfa template was then used to outline the posterior auricular interpolation flap.  The donor area for the pedicle flap was then injected with anesthesia.  The flap was excised through the skin and subcutaneous tissue down to the layer of the underlying musculature.  The pedicle flap was carefully excised within this deep plane to maintain its blood supply.  The edges of the donor site were undermined.   The donor site was closed in a primary fashion.  The pedicle was then rotated into position and sutured.  Once the tube was sutured into place, adequate blood supply was confirmed with blanching and refill.  The pedicle was then wrapped with xeroform gauze and dressed appropriately with a telfa and gauze bandage to ensure continued blood supply and protect the attached pedicle.
V-Y Plasty Text: The defect edges were debeveled with a #15 scalpel blade.  Given the location of the defect, shape of the defect and the proximity to free margins an V-Y advancement flap was deemed most appropriate.  Using a sterile surgical marker, an appropriate advancement flap was drawn incorporating the defect and placing the expected incisions within the relaxed skin tension lines where possible.    The area thus outlined was incised deep to adipose tissue with a #15 scalpel blade.  The skin margins were undermined to an appropriate distance in all directions utilizing iris scissors.
W Plasty Text: The lesion was extirpated to the level of the fat with a #15 scalpel blade.  Given the location of the defect, shape of the defect and the proximity to free margins a W-plasty was deemed most appropriate for repair.  Using a sterile surgical marker, the appropriate transposition arms of the W-plasty were drawn incorporating the defect and placing the expected incisions within the relaxed skin tension lines where possible.    The area thus outlined was incised deep to adipose tissue with a #15 scalpel blade.  The skin margins were undermined to an appropriate distance in all directions utilizing iris scissors.  The opposing transposition arms were then transposed into place in opposite direction and anchored with interrupted buried subcutaneous sutures.
Localized Dermabrasion With Wire Brush Text: The patient was draped in routine manner.  Localized dermabrasion using 3 x 17 mm wire brush was performed in routine manner to papillary dermis. This spot dermabrasion is being performed to complete skin cancer reconstruction. It also will eliminate the other sun damaged precancerous cells that are known to be part of the regional effect of a lifetime's worth of sun exposure. This localized dermabrasion is therapeutic and should not be considered cosmetic in any regard.
Spiral Flap Text: The defect edges were debeveled with a #15 scalpel blade.  Given the location of the defect, shape of the defect and the proximity to free margins a spiral flap was deemed most appropriate.  Using a sterile surgical marker, an appropriate rotation flap was drawn incorporating the defect and placing the expected incisions within the relaxed skin tension lines where possible. The area thus outlined was incised deep to adipose tissue with a #15 scalpel blade.  The skin margins were undermined to an appropriate distance in all directions utilizing iris scissors.
Consent (Scalp)/Introductory Paragraph: The rationale for Mohs was explained to the patient and consent was obtained. The risks, benefits and alternatives to therapy were discussed in detail. Specifically, the risks of changes in hair growth pattern secondary to repair, infection, scarring, bleeding, prolonged wound healing, incomplete removal, allergy to anesthesia, nerve injury and recurrence were addressed. Prior to the procedure, the treatment site was clearly identified and confirmed by the patient. All components of Universal Protocol/PAUSE Rule completed.
Dressing (No Sutures): dry sterile dressing
Dermal Autograft Text: The defect edges were debeveled with a #15 scalpel blade.  Given the location of the defect, shape of the defect and the proximity to free margins a dermal autograft was deemed most appropriate.  Using a sterile surgical marker, the primary defect shape was transferred to the donor site. The area thus outlined was incised deep to adipose tissue with a #15 scalpel blade.  The harvested graft was then trimmed of adipose and epidermal tissue until only dermis was left.  The skin graft was then placed in the primary defect and oriented appropriately.
Body Location Override (Optional - Billing Will Still Be Based On Selected Body Map Location If Applicable): right maria c bowl
Detail Level: Detailed
Consent Type: Consent 1 (Standard)
Suture Removal: 7 days
Wound Care: Aquaphor
Same Histology In Subsequent Stages Text: The pattern and morphology of the tumor is as described in the first stage.
Consent (Ear)/Introductory Paragraph: The rationale for Mohs was explained to the patient and consent was obtained. The risks, benefits and alternatives to therapy were discussed in detail. Specifically, the risks of ear deformity, infection, scarring, bleeding, prolonged wound healing, incomplete removal, allergy to anesthesia, nerve injury and recurrence were addressed. Prior to the procedure, the treatment site was clearly identified and confirmed by the patient. All components of Universal Protocol/PAUSE Rule completed.
Area H Indication Text: Tumors in this location are included in Area H (eyelids, eyebrows, nose, lips, chin, ear, pre-auricular, post-auricular, temple, genitalia, hands, feet, ankles and areola).  Tissue conservation is critical in these anatomic locations.
Medical Necessity For Repair Override: unable to close wound primarily and to reduce risk of bleeding post op into the ear canal
Referred To Mid-Level For Closure Text (Leave Blank If You Do Not Want): After obtaining clear surgical margins the patient was sent to a mid-level provider for surgical repair.  The patient understands they will receive post-surgical care and follow-up from the mid-level provider.
Cheek-To-Nose Interpolation Flap Text: A decision was made to reconstruct the defect utilizing an interpolation axial flap and a staged reconstruction.  A telfa template was made of the defect.  This telfa template was then used to outline the Cheek-To-Nose Interpolation flap.  The donor area for the pedicle flap was then injected with anesthesia.  The flap was excised through the skin and subcutaneous tissue down to the layer of the underlying musculature.  The interpolation flap was carefully excised within this deep plane to maintain its blood supply.  The edges of the donor site were undermined.   The donor site was closed in a primary fashion.  The pedicle was then rotated into position and sutured.  Once the tube was sutured into place, adequate blood supply was confirmed with blanching and refill.  The pedicle was then wrapped with xeroform gauze and dressed appropriately with a telfa and gauze bandage to ensure continued blood supply and protect the attached pedicle.
Complex Repair And Graft Additional Text (Will Appearing After The Standard Complex Repair Text): The complex repair was not sufficient to completely close the primary defect. The remaining additional defect was repaired with the graft mentioned below.
Alternatives Discussed Intro (Do Not Add Period): I discussed alternative treatments to Mohs surgery and specifically discussed the risks and benefits of
Advancement-Rotation Flap Text: The defect edges were debeveled with a #15 scalpel blade.  Given the location of the defect, shape of the defect and the proximity to free margins an advancement-rotation flap was deemed most appropriate.  Using a sterile surgical marker, an appropriate flap was drawn incorporating the defect and placing the expected incisions within the relaxed skin tension lines where possible. The area thus outlined was incised deep to adipose tissue with a #15 scalpel blade.  The skin margins were undermined to an appropriate distance in all directions utilizing iris scissors.
Dressing: pressure dressing with telfa
Estimated Blood Loss (Cc): minimal
O-L Flap Text: The defect edges were debeveled with a #15 scalpel blade.  Given the location of the defect, shape of the defect and the proximity to free margins an O-L flap was deemed most appropriate.  Using a sterile surgical marker, an appropriate advancement flap was drawn incorporating the defect and placing the expected incisions within the relaxed skin tension lines where possible.    The area thus outlined was incised deep to adipose tissue with a #15 scalpel blade.  The skin margins were undermined to an appropriate distance in all directions utilizing iris scissors.
Island Pedicle Flap-Requiring Vessel Identification Text: The defect edges were debeveled with a #15 scalpel blade.  Given the location of the defect, shape of the defect and the proximity to free margins an island pedicle advancement flap was deemed most appropriate.  Using a sterile surgical marker, an appropriate advancement flap was drawn, based on the axial vessel mentioned above, incorporating the defect, outlining the appropriate donor tissue and placing the expected incisions within the relaxed skin tension lines where possible.    The area thus outlined was incised deep to adipose tissue with a #15 scalpel blade.  The skin margins were undermined to an appropriate distance in all directions around the primary defect and laterally outward around the island pedicle utilizing iris scissors.  There was minimal undermining beneath the pedicle flap.
Cartilage Graft Text: The defect edges were debeveled with a #15 scalpel blade.  Given the location of the defect, shape of the defect, the fact the defect involved a full thickness cartilage defect a cartilage graft was deemed most appropriate.  An appropriate donor site was identified, cleansed, and anesthetized. The cartilage graft was then harvested and transferred to the recipient site, oriented appropriately and then sutured into place.  The secondary defect was then repaired using a primary closure.
Ear Wedge Repair Text: A wedge excision was completed by carrying down an excision through the full thickness of the ear and cartilage with an inward facing Burow's triangle. The wound was then closed in a layered fashion.
Tissue Cultured Epidermal Autograft Text: The defect edges were debeveled with a #15 scalpel blade.  Given the location of the defect, shape of the defect and the proximity to free margins a tissue cultured epidermal autograft was deemed most appropriate.  The graft was then trimmed to fit the size of the defect.  The graft was then placed in the primary defect and oriented appropriately.
Non-Graft Cartilage Fenestration Text: The cartilage was fenestrated with a 2mm punch biopsy to help facilitate healing.
Unna Boot Text: An Unna boot was placed to help immobilize the limb and facilitate more rapid healing.
Melolabial Transposition Flap Text: The defect edges were debeveled with a #15 scalpel blade.  Given the location of the defect and the proximity to free margins a melolabial flap was deemed most appropriate.  Using a sterile surgical marker, an appropriate melolabial transposition flap was drawn incorporating the defect.    The area thus outlined was incised deep to adipose tissue with a #15 scalpel blade.  The skin margins were undermined to an appropriate distance in all directions utilizing iris scissors.
Interpolation Flap Text: A decision was made to reconstruct the defect utilizing an interpolation axial flap and a staged reconstruction.  A telfa template was made of the defect.  This telfa template was then used to outline the interpolation flap.  The donor area for the pedicle flap was then injected with anesthesia.  The flap was excised through the skin and subcutaneous tissue down to the layer of the underlying musculature.  The interpolation flap was carefully excised within this deep plane to maintain its blood supply.  The edges of the donor site were undermined.   The donor site was closed in a primary fashion.  The pedicle was then rotated into position and sutured.  Once the tube was sutured into place, adequate blood supply was confirmed with blanching and refill.  The pedicle was then wrapped with xeroform gauze and dressed appropriately with a telfa and gauze bandage to ensure continued blood supply and protect the attached pedicle.
Purse String (Simple) Text: Given the location of the defect and the characteristics of the surrounding skin a purse string closure was deemed most appropriate.  Undermining was performed circumfirentially around the surgical defect.  A purse string suture was then placed and tightened.
Bcc Infiltrative Histology Text: There were numerous aggregates of basaloid cells demonstrating an infiltrative pattern.
Repair Type: Graft
Transposition Flap Text: The defect edges were debeveled with a #15 scalpel blade.  Given the location of the defect and the proximity to free margins a transposition flap was deemed most appropriate.  Using a sterile surgical marker, an appropriate transposition flap was drawn incorporating the defect.    The area thus outlined was incised deep to adipose tissue with a #15 scalpel blade.  The skin margins were undermined to an appropriate distance in all directions utilizing iris scissors.
Cheiloplasty (Less Than 50%) Text: A decision was made to reconstruct the defect with a  cheiloplasty.  The defect was undermined extensively.  Additional obicularis oris muscle was excised with a 15 blade scalpel.  The defect was converted into a full thickness wedge, of less than 50% of the vertical height of the lip, to facilite a better cosmetic result.  Small vessels were then tied off with 5-0 monocyrl. The obicularis oris, superficial fascia, adipose and dermis were then reapproximated.  After the deeper layers were approximated the epidermis was reapproximated with particular care given to realign the vermilion border.
Consent (Marginal Mandibular)/Introductory Paragraph: The rationale for Mohs was explained to the patient and consent was obtained. The risks, benefits and alternatives to therapy were discussed in detail. Specifically, the risks of damage to the marginal mandibular branch of the facial nerve, infection, scarring, bleeding, prolonged wound healing, incomplete removal, allergy to anesthesia, and recurrence were addressed. Prior to the procedure, the treatment site was clearly identified and confirmed by the patient. All components of Universal Protocol/PAUSE Rule completed.
Mauc Instructions: By selecting yes to the question below the MAUC number will be added into the note.  This will be calculated automatically based on the diagnosis chosen, the size entered, the body zone selected (H,M,L) and the specific indications you chose. You will also have the option to override the Mohs AUC if you disagree with the automatically calculated number and this option is found in the Case Summary tab.
Bilobed Transposition Flap Text: The defect edges were debeveled with a #15 scalpel blade.  Given the location of the defect and the proximity to free margins a bilobed transposition flap was deemed most appropriate.  Using a sterile surgical marker, an appropriate bilobe flap drawn around the defect.    The area thus outlined was incised deep to adipose tissue with a #15 scalpel blade.  The skin margins were undermined to an appropriate distance in all directions utilizing iris scissors.
Simple / Intermediate / Complex Repair - Final Wound Length In Cm: 6.2
Mohs Histo Method Verbiage: Each section was then chromacoded and processed in the Mohs lab using the Mohs protocol and submitted for frozen section.
Tarsorrhaphy Text: A tarsorrhaphy was performed using Frost sutures.
Z Plasty Text: The lesion was extirpated to the level of the fat with a #15 scalpel blade.  Given the location of the defect, shape of the defect and the proximity to free margins a Z-plasty was deemed most appropriate for repair.  Using a sterile surgical marker, the appropriate transposition arms of the Z-plasty were drawn incorporating the defect and placing the expected incisions within the relaxed skin tension lines where possible.    The area thus outlined was incised deep to adipose tissue with a #15 scalpel blade.  The skin margins were undermined to an appropriate distance in all directions utilizing iris scissors.  The opposing transposition arms were then transposed into place in opposite direction and anchored with interrupted buried subcutaneous sutures.
Wound Care (No Sutures): Petrolatum
O-Z Plasty Text: The defect edges were debeveled with a #15 scalpel blade.  Given the location of the defect, shape of the defect and the proximity to free margins an O-Z plasty (double transposition flap) was deemed most appropriate.  Using a sterile surgical marker, the appropriate transposition flaps were drawn incorporating the defect and placing the expected incisions within the relaxed skin tension lines where possible.    The area thus outlined was incised deep to adipose tissue with a #15 scalpel blade.  The skin margins were undermined to an appropriate distance in all directions utilizing iris scissors.  Hemostasis was achieved with electrocautery.  The flaps were then transposed into place, one clockwise and the other counterclockwise, and anchored with interrupted buried subcutaneous sutures.

## 2018-07-18 NOTE — HPI: PROCEDURE (MOHS)
Additional History: He is unsure if it was treated before several years ago or if it was just biopsied
Body Location Override (Optional): right maria c bowl

## 2018-07-25 ENCOUNTER — APPOINTMENT (RX ONLY)
Dept: URBAN - METROPOLITAN AREA CLINIC 36 | Facility: CLINIC | Age: 73
Setting detail: DERMATOLOGY
End: 2018-07-25

## 2018-07-25 DIAGNOSIS — Z48.817 ENCOUNTER FOR SURGICAL AFTERCARE FOLLOWING SURGERY ON THE SKIN AND SUBCUTANEOUS TISSUE: ICD-10-CM

## 2018-07-25 PROCEDURE — ? POST-OP WOUND CHECK

## 2018-07-25 PROCEDURE — 99024 POSTOP FOLLOW-UP VISIT: CPT

## 2018-07-25 ASSESSMENT — LOCATION SIMPLE DESCRIPTION DERM: LOCATION SIMPLE: LEFT EAR

## 2018-07-25 ASSESSMENT — LOCATION ZONE DERM: LOCATION ZONE: EAR

## 2018-07-25 ASSESSMENT — LOCATION DETAILED DESCRIPTION DERM: LOCATION DETAILED: LEFT CAVUM CONCHA

## 2018-07-25 NOTE — PROCEDURE: POST-OP WOUND CHECK
Detail Level: Detailed
Wound Evaluated By: Deandra Gonzáles MD
Wound Dressing Override (Optional): improving wound
Add 66460 Cpt? (Important Note: In 2017 The Use Of 66697 Is Being Tracked By Cms To Determine Future Global Period Reimbursement For Global Periods): yes

## 2018-08-08 ENCOUNTER — APPOINTMENT (RX ONLY)
Dept: URBAN - METROPOLITAN AREA CLINIC 36 | Facility: CLINIC | Age: 73
Setting detail: DERMATOLOGY
End: 2018-08-08

## 2018-08-08 DIAGNOSIS — Z48.817 ENCOUNTER FOR SURGICAL AFTERCARE FOLLOWING SURGERY ON THE SKIN AND SUBCUTANEOUS TISSUE: ICD-10-CM

## 2018-08-08 PROCEDURE — 99024 POSTOP FOLLOW-UP VISIT: CPT

## 2018-08-08 PROCEDURE — ? POST-OP WOUND CHECK

## 2018-08-08 ASSESSMENT — LOCATION ZONE DERM: LOCATION ZONE: EAR

## 2018-08-08 ASSESSMENT — LOCATION DETAILED DESCRIPTION DERM: LOCATION DETAILED: RIGHT CRUS OF HELIX

## 2018-08-08 ASSESSMENT — LOCATION SIMPLE DESCRIPTION DERM: LOCATION SIMPLE: RIGHT EAR

## 2018-08-08 NOTE — PROCEDURE: POST-OP WOUND CHECK
Detail Level: Detailed
Wound Evaluated By: Deandra Gonzáles MD
Add 36175 Cpt? (Important Note: In 2017 The Use Of 76340 Is Being Tracked By Cms To Determine Future Global Period Reimbursement For Global Periods): yes

## 2018-09-06 ENCOUNTER — TELEPHONE (OUTPATIENT)
Dept: RHEUMATOLOGY | Facility: PHYSICIAN GROUP | Age: 73
End: 2018-09-06

## 2018-09-06 NOTE — TELEPHONE ENCOUNTER
Patient called to cancel and reschedule appt Oct 11 [place] and wanted to see if [facility]        Per Dr Colvin we can add to cancellation list and she suggested he can also [facility]umat[facility] since she will be leaving practice     Spoke with patient and he stated he was feeling great so he agreed to [facility] when he needs to

## 2021-01-15 DIAGNOSIS — Z23 NEED FOR VACCINATION: ICD-10-CM

## 2025-07-01 ENCOUNTER — APPOINTMENT (OUTPATIENT)
Dept: URBAN - METROPOLITAN AREA CLINIC 4 | Facility: CLINIC | Age: 80
Setting detail: DERMATOLOGY
End: 2025-07-01

## 2025-07-01 DIAGNOSIS — L57.0 ACTINIC KERATOSIS: ICD-10-CM

## 2025-07-01 DIAGNOSIS — Z71.89 OTHER SPECIFIED COUNSELING: ICD-10-CM

## 2025-07-01 DIAGNOSIS — D22 MELANOCYTIC NEVI: ICD-10-CM

## 2025-07-01 DIAGNOSIS — L81.4 OTHER MELANIN HYPERPIGMENTATION: ICD-10-CM

## 2025-07-01 DIAGNOSIS — L82.1 OTHER SEBORRHEIC KERATOSIS: ICD-10-CM

## 2025-07-01 DIAGNOSIS — D36.1 BENIGN NEOPLASM OF PERIPHERAL NERVES AND AUTONOMIC NERVOUS SYSTEM: ICD-10-CM

## 2025-07-01 DIAGNOSIS — Z85.828 PERSONAL HISTORY OF OTHER MALIGNANT NEOPLASM OF SKIN: ICD-10-CM

## 2025-07-01 DIAGNOSIS — D18.0 HEMANGIOMA: ICD-10-CM

## 2025-07-01 PROBLEM — D22.61 MELANOCYTIC NEVI OF RIGHT UPPER LIMB, INCLUDING SHOULDER: Status: ACTIVE | Noted: 2025-07-01

## 2025-07-01 PROBLEM — D18.01 HEMANGIOMA OF SKIN AND SUBCUTANEOUS TISSUE: Status: ACTIVE | Noted: 2025-07-01

## 2025-07-01 PROBLEM — D48.5 NEOPLASM OF UNCERTAIN BEHAVIOR OF SKIN: Status: ACTIVE | Noted: 2025-07-01

## 2025-07-01 PROBLEM — D22.62 MELANOCYTIC NEVI OF LEFT UPPER LIMB, INCLUDING SHOULDER: Status: ACTIVE | Noted: 2025-07-01

## 2025-07-01 PROBLEM — D36.14 BENIGN NEOPLASM OF PERIPHERAL NERVES AND AUTONOMIC NERVOUS SYSTEM OF THORAX: Status: ACTIVE | Noted: 2025-07-01

## 2025-07-01 PROCEDURE — ? SUNSCREEN RECOMMENDATIONS

## 2025-07-01 PROCEDURE — ? COUNSELING

## 2025-07-01 PROCEDURE — ? BIOPSY BY SHAVE METHOD

## 2025-07-01 PROCEDURE — ? LIQUID NITROGEN

## 2025-07-01 ASSESSMENT — LOCATION SIMPLE DESCRIPTION DERM
LOCATION SIMPLE: RIGHT UPPER ARM
LOCATION SIMPLE: LEFT HAND
LOCATION SIMPLE: RIGHT EAR
LOCATION SIMPLE: LEFT FOREARM
LOCATION SIMPLE: RIGHT SCALP
LOCATION SIMPLE: LEFT UPPER ARM
LOCATION SIMPLE: CHEST
LOCATION SIMPLE: LEFT LOWER BACK
LOCATION SIMPLE: RIGHT HAND
LOCATION SIMPLE: RIGHT WRIST

## 2025-07-01 ASSESSMENT — LOCATION DETAILED DESCRIPTION DERM
LOCATION DETAILED: RIGHT RADIAL DORSAL HAND
LOCATION DETAILED: LEFT DISTAL POSTERIOR UPPER ARM
LOCATION DETAILED: RIGHT CENTRAL FRONTAL SCALP
LOCATION DETAILED: LEFT VENTRAL PROXIMAL FOREARM
LOCATION DETAILED: LEFT PROXIMAL POSTERIOR UPPER ARM
LOCATION DETAILED: RIGHT MEDIAL SUPERIOR CHEST
LOCATION DETAILED: LEFT SUPERIOR MEDIAL MIDBACK
LOCATION DETAILED: LEFT ULNAR DORSAL HAND
LOCATION DETAILED: RIGHT PROXIMAL POSTERIOR UPPER ARM
LOCATION DETAILED: RIGHT DISTAL POSTERIOR UPPER ARM
LOCATION DETAILED: RIGHT CAVUM CONCHA
LOCATION DETAILED: RIGHT DORSAL WRIST

## 2025-07-01 ASSESSMENT — LOCATION ZONE DERM
LOCATION ZONE: EAR
LOCATION ZONE: TRUNK
LOCATION ZONE: HAND
LOCATION ZONE: SCALP
LOCATION ZONE: ARM

## 2025-08-13 ENCOUNTER — APPOINTMENT (OUTPATIENT)
Dept: URBAN - METROPOLITAN AREA CLINIC 36 | Facility: CLINIC | Age: 80
Setting detail: DERMATOLOGY
End: 2025-08-13

## 2025-08-13 PROBLEM — C44.329 SQUAMOUS CELL CARCINOMA OF SKIN OF OTHER PARTS OF FACE: Status: ACTIVE | Noted: 2025-08-13

## 2025-08-13 PROCEDURE — ? MOHS SURGERY

## 2025-08-20 ENCOUNTER — APPOINTMENT (OUTPATIENT)
Dept: URBAN - METROPOLITAN AREA CLINIC 36 | Facility: CLINIC | Age: 80
Setting detail: DERMATOLOGY
End: 2025-08-20

## 2025-08-20 DIAGNOSIS — Z48.02 ENCOUNTER FOR REMOVAL OF SUTURES: ICD-10-CM

## 2025-08-20 PROCEDURE — ? SUTURE REMOVAL (GLOBAL PERIOD)

## 2025-08-20 ASSESSMENT — LOCATION SIMPLE DESCRIPTION DERM: LOCATION SIMPLE: LEFT CHEEK

## 2025-08-20 ASSESSMENT — LOCATION ZONE DERM: LOCATION ZONE: FACE

## 2025-08-20 ASSESSMENT — LOCATION DETAILED DESCRIPTION DERM: LOCATION DETAILED: LEFT SUPERIOR LATERAL MALAR CHEEK
